# Patient Record
Sex: FEMALE | Race: WHITE | NOT HISPANIC OR LATINO | Employment: OTHER | URBAN - METROPOLITAN AREA
[De-identification: names, ages, dates, MRNs, and addresses within clinical notes are randomized per-mention and may not be internally consistent; named-entity substitution may affect disease eponyms.]

---

## 2017-02-25 ENCOUNTER — APPOINTMENT (EMERGENCY)
Dept: RADIOLOGY | Facility: HOSPITAL | Age: 72
End: 2017-02-25
Payer: MEDICARE

## 2017-02-25 ENCOUNTER — HOSPITAL ENCOUNTER (EMERGENCY)
Facility: HOSPITAL | Age: 72
Discharge: HOME | End: 2017-02-25
Attending: EMERGENCY MEDICINE | Admitting: EMERGENCY MEDICINE
Payer: MEDICARE

## 2017-02-25 VITALS
SYSTOLIC BLOOD PRESSURE: 162 MMHG | RESPIRATION RATE: 18 BRPM | OXYGEN SATURATION: 100 % | BODY MASS INDEX: 25.23 KG/M2 | HEIGHT: 66 IN | DIASTOLIC BLOOD PRESSURE: 71 MMHG | TEMPERATURE: 97.9 F | WEIGHT: 157 LBS | HEART RATE: 81 BPM
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
## 2017-02-25 DIAGNOSIS — S02.2XXA CLOSED FRACTURE OF NASAL BONE, INITIAL ENCOUNTER: Primary | ICD-10-CM

## 2017-02-25 DIAGNOSIS — S80.211A ABRASION OF KNEE, BILATERAL: ICD-10-CM

## 2017-02-25 DIAGNOSIS — S80.212A ABRASION OF KNEE, BILATERAL: ICD-10-CM

## 2017-02-25 DIAGNOSIS — S62.655A CLOSED NONDISPLACED FRACTURE OF MIDDLE PHALANX OF LEFT RING FINGER, INITIAL ENCOUNTER: ICD-10-CM

## 2017-02-25 PROCEDURE — 73564 X-RAY EXAM KNEE 4 OR MORE: CPT

## 2017-02-25 PROCEDURE — 73140 X-RAY EXAM OF FINGER(S): CPT

## 2017-02-25 PROCEDURE — 70450 CT HEAD/BRAIN W/O DYE: CPT

## 2017-02-25 PROCEDURE — 99284 EMERGENCY DEPT VISIT MOD MDM: CPT

## 2017-02-25 PROCEDURE — 70486 CT MAXILLOFACIAL W/O DYE: CPT

## 2017-02-25 RX ORDER — OXYMETAZOLINE HYDROCHLORIDE 0.05 G/100ML
2 SPRAY NASAL 2 TIMES DAILY
Qty: 14.7 ML | Refills: 0 | Status: SHIPPED | OUTPATIENT
Start: 2017-02-25 | End: 2019-03-07 | Stop reason: ALTCHOICE

## 2017-02-25 RX ORDER — ACETAMINOPHEN 325 MG/1
TABLET ORAL
Status: COMPLETED
Start: 2017-02-25 | End: 2017-02-25

## 2017-02-25 RX ORDER — ACETAMINOPHEN 325 MG/1
650 TABLET ORAL ONCE
Status: DISCONTINUED | OUTPATIENT
Start: 2017-02-25 | End: 2017-02-25 | Stop reason: HOSPADM

## 2017-02-25 RX ORDER — BACITRACIN, NEOMYCIN, POLYMYXIN B 400; 3.5; 5 [USP'U]/G; MG/G; [USP'U]/G
1 OINTMENT TOPICAL 2 TIMES DAILY
Status: DISCONTINUED | OUTPATIENT
Start: 2017-02-25 | End: 2017-02-25 | Stop reason: HOSPADM

## 2017-02-25 RX ORDER — FOLIC ACID/MULTIVIT,IRON,MINER .4-18-35
1 TABLET,CHEWABLE ORAL DAILY
COMMUNITY

## 2017-02-25 RX ADMIN — ACETAMINOPHEN 650 MG: 325 TABLET, FILM COATED ORAL at 09:57

## 2017-02-25 RX ADMIN — NEOMYCIN AND POLYMYXIN B SULFATES AND BACITRACIN ZINC 1 SMALL APPLICATION: 400; 3.5; 5 OINTMENT TOPICAL at 09:59

## 2017-03-07 ENCOUNTER — ALLSCRIPTS OFFICE VISIT (OUTPATIENT)
Dept: OTHER | Facility: OTHER | Age: 72
End: 2017-03-07

## 2017-03-24 ENCOUNTER — ALLSCRIPTS OFFICE VISIT (OUTPATIENT)
Dept: OTHER | Facility: OTHER | Age: 72
End: 2017-03-24

## 2017-03-24 DIAGNOSIS — M19.049 PRIMARY OSTEOARTHRITIS OF HAND: ICD-10-CM

## 2017-03-24 DIAGNOSIS — S62.605A: ICD-10-CM

## 2017-04-03 ENCOUNTER — GENERIC CONVERSION - ENCOUNTER (OUTPATIENT)
Dept: OTHER | Facility: OTHER | Age: 72
End: 2017-04-03

## 2017-04-03 ENCOUNTER — APPOINTMENT (OUTPATIENT)
Dept: OCCUPATIONAL THERAPY | Age: 72
End: 2017-04-03
Payer: MEDICARE

## 2017-04-03 DIAGNOSIS — S62.605A: ICD-10-CM

## 2017-04-03 DIAGNOSIS — M19.049 PRIMARY OSTEOARTHRITIS OF HAND: ICD-10-CM

## 2017-04-03 PROCEDURE — G8990 OTHER PT/OT CURRENT STATUS: HCPCS

## 2017-04-03 PROCEDURE — 97165 OT EVAL LOW COMPLEX 30 MIN: CPT

## 2017-04-03 PROCEDURE — G8991 OTHER PT/OT GOAL STATUS: HCPCS

## 2017-04-12 ENCOUNTER — APPOINTMENT (OUTPATIENT)
Dept: OCCUPATIONAL THERAPY | Age: 72
End: 2017-04-12
Payer: MEDICARE

## 2017-04-12 PROCEDURE — 97140 MANUAL THERAPY 1/> REGIONS: CPT

## 2017-04-12 PROCEDURE — 97018 PARAFFIN BATH THERAPY: CPT

## 2017-04-12 PROCEDURE — 97110 THERAPEUTIC EXERCISES: CPT

## 2017-04-14 ENCOUNTER — APPOINTMENT (OUTPATIENT)
Dept: OCCUPATIONAL THERAPY | Age: 72
End: 2017-04-14
Payer: MEDICARE

## 2017-04-17 ENCOUNTER — APPOINTMENT (OUTPATIENT)
Dept: OCCUPATIONAL THERAPY | Age: 72
End: 2017-04-17
Payer: MEDICARE

## 2017-04-17 PROCEDURE — 97110 THERAPEUTIC EXERCISES: CPT

## 2017-04-17 PROCEDURE — 97018 PARAFFIN BATH THERAPY: CPT

## 2017-04-21 ENCOUNTER — APPOINTMENT (OUTPATIENT)
Dept: OCCUPATIONAL THERAPY | Age: 72
End: 2017-04-21
Payer: MEDICARE

## 2017-04-21 PROCEDURE — 97110 THERAPEUTIC EXERCISES: CPT

## 2017-04-21 PROCEDURE — G8991 OTHER PT/OT GOAL STATUS: HCPCS

## 2017-04-21 PROCEDURE — 97140 MANUAL THERAPY 1/> REGIONS: CPT

## 2017-04-21 PROCEDURE — G8992 OTHER PT/OT  D/C STATUS: HCPCS

## 2017-04-21 PROCEDURE — 97018 PARAFFIN BATH THERAPY: CPT

## 2017-07-06 ENCOUNTER — GENERIC CONVERSION - ENCOUNTER (OUTPATIENT)
Dept: OTHER | Facility: OTHER | Age: 72
End: 2017-07-06

## 2017-10-13 ENCOUNTER — ALLSCRIPTS OFFICE VISIT (OUTPATIENT)
Dept: OTHER | Facility: OTHER | Age: 72
End: 2017-10-13

## 2017-10-13 DIAGNOSIS — Z12.31 ENCOUNTER FOR SCREENING MAMMOGRAM FOR MALIGNANT NEOPLASM OF BREAST: ICD-10-CM

## 2017-10-14 NOTE — PROGRESS NOTES
Assessment  1  Visit for screening mammogram (V76 12) (Z12 31)   2  Encounter for routine gynecological examination (V72 31) (Z01 419)    Pelvic exam reveals the uterus to be anterior mobile normal size  No ovarian masses are detected  Plan  Encounter for routine gynecological examination    · Follow-up visit in 1 year Evaluation and Treatment  Follow-up  Status: Hold For -  Scheduling  Requested for: [id number]   Ordered; For: Encounter for routine gynecological examination; Ordered By: Marry Franco Performed:  Due: 34LFH7205   · (1) THIN PREP PAP WITH IMAGING; Status:Canceled - Retrospective Authorization;    Perform:[address]; Due:13Oct2018; Last Updated By:Treva Umaña; 10/13/2017 10:10:28 AM;Ordered;For:Encounter for routine gynecological examination; Ordered By:Florentin Davenport; Maturation index required? : No  :   HPV? : if ASCUS  Encounter for screening mammogram for malignant neoplasm of breast    · * MAMMO SCREENING BILATERAL W CAD; Status:Hold For - Scheduling,Retrospective  Authorization; Requested XRA:[id number];    Perform:[facility]; Due:13Oct2018; Last Updated By:Treva Umaña; 10/13/2017 8:32:49 AM;Ordered;For:Encounter for screening mammogram for malignant neoplasm of breast; Ordered By:Florentin Davenport; A cervical Pap smear was  taken at this visit  The patient was given a slip for bilateral screening mammogram to be performed after July 6, 2018  The patient will return in one year for follow-up GYN evaluation     Discussion/Summary    The patient was told that her breast and pelvic exam are normal  She will use Estrace vaginal cream 1 g in the vagina once a week  She was given samples the last 4 year  She will call if she sees any vaginal bleeding over the next year        Chief Complaint  annual exam no problems      History of Present Illness  HPI: This 79-year-old patient has had no vaginal bleeding or episodes of vaginitis over the past year  [place] is healthy with the use of Estrace vaginal cream 1 g in the vagina once a week  She is sensitive to heat  She has no chronic headaches or fainting spells  She does use a liner daily due to some pain or drainage at times  GYN HM, Adult Female [facility]:   General Health:   Lifestyle:  She does not use tobacco -- She consumes alcohol  Reproductive health: the patient is postmenopausal--   she reports no menstrual problems  -- she uses no contraception  -- she is sexually active  -- pregnancy history: G 2P 2,-- 2    Screening: Cervical cancer screening includes a pap smear performed 10/10/2016  Breast cancer screening includes a mammogram performed 7/6/2016  Colorectal cancer screening includes uncertain timing of the last colonoscopy  Review of Systems    Constitutional: No fever, no chills, feels well, no tiredness, no recent weight gain or loss  ENT: no ear ache, no loss of hearing, no nosebleeds or nasal discharge, no sore throat or hoarseness  Cardiovascular: no complaints of slow or fast heart rate, no chest pain, no palpitations, no leg claudication or lower extremity edema  Respiratory: no complaints of shortness of breath, no wheezing, no dyspnea on exertion, no orthopnea or PND  Breasts: no complaints of breast pain, breast lump or nipple discharge  Gastrointestinal: diarrhea, but-- as noted in HPI  Genitourinary: no complaints of dysuria, no incontinence, no pelvic pain, no dysmenorrhea, no vaginal discharge or abnormal vaginal bleeding  Musculoskeletal: no complaints of arthralgia, no myalgia, no joint swelling or stiffness, no limb pain or swelling  Integumentary: no complaints of skin rash or lesion, no itching or dry skin, no skin wounds  Neurological: no complaints of headache, no confusion, no numbness or tingling, no dizziness or fainting  Active Problems  1  ASCUS favoring benign (796 9)   2   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) 3  Encounter for routine gynecological examination (V72 31) (Z01 419)   4  Encounter for screening mammogram for malignant neoplasm of breast (V76 12)   (Z12 31)   5  Fracture of phalanx of left ring finger (816 00) (S67 950H)   6  History of abnormal cervical Pap smear (V13 29) (Z87 898)   7  Menopause (627 2)   8  Pap smear abnormality of cervix with ASCUS favoring benign (795 01) (R87 610)   9  Pap smear, as part of routine gynecological examination (V76 2) (Z01 419)   10  Visit for screening mammogram (V76 12) (Z12 31)    Past Medical History   · History of Asymptomatic menopausal state (V49 81) (Z78 0)   · History Of 2  Previous Pregnancies (V61 5)   · History of osteopenia (V13 59) (Z87 39)   · History of sprain of ankle (V13 59) (Y70 428)    Surgical History   · History of Cholecystectomy Laparoscopic    Family History  Father    · No pertinent family history  Family History    · Family history of Heart Disease (V17 49)    Social History   · Being A Social Drinker   · Exercising Regularly   · Never A Smoker    Current Meds   1  Aspirin 81 MG TABS; Therapy: (Recorded:52Xpp6230) to Recorded   2  Estrace 1 MG Oral Tablet; Therapy: (Recorded:13Oct2017) to Recorded   3  Multi-Vitamin TABS; Therapy: (Janneth Bates) to Recorded   4  Simvastatin 10 MG Oral Tablet; Therapy: 99GHH9808 to (Last Rx:17Nov2010)  Requested for: 13XIC9525 Ordered    Allergies  1  Benadryl TABS   2  Penicillins    Vitals   Recorded: 13WEJ6335 20:48LP   Systolic 439   Diastolic 72   Height 5 ft 5 5 in   Weight 162 lb    BMI Calculated 26 55   BSA Calculated 1 82   LMP      Physical Exam    Constitutional   General appearance: No acute distress, well appearing and well nourished  Neck   Neck: Normal, supple, trachea midline, no masses  Thyroid: Normal, no thyromegaly  Pulmonary   Respiratory effort: No increased work of breathing or signs of respiratory distress  Auscultation of lungs: Clear to auscultation  Cardiovascular   Auscultation of heart: Normal rate and rhythm, normal S1 and S2, no murmurs  Peripheral vascular exam: Normal pulses Throughout  Genitourinary   External genitalia: Normal and no lesions appreciated  Vagina: Normal, no lesions or dryness appreciated  Urethra: Normal     Urethral meatus: Normal     Bladder: Normal, soft, non-tender and no prolapse or masses appreciated  Cervix: Normal, no palpable masses  Uterus: Normal, non-tender, not enlarged, and no palpable masses  Adnexa/parametria: Normal, non-tender and no fullness or masses appreciated  Anus, perineum, and rectum: Normal sphincter tone, no masses, and no prolapse  Chest   Breasts: Normal and no dimpling or skin changes noted  Abdomen   Abdomen: Normal, non-tender, and no organomegaly noted  Liver and spleen: No hepatomegaly or splenomegaly  Examination for hernias: No hernias appreciated  Lymphatic   Palpation of lymph nodes in neck, axillae, groin and/or other locations: No lymphadenopathy or masses noted  Skin   Skin and subcutaneous tissue: Normal skin turgor and no rashes      Palpation of skin and subcutaneous tissue: Normal     Psychiatric   Orientation to person, place, and time: Normal     Mood and affect: Normal        Signatures   Electronically signed by : Elsa Ken MD; Oct 13 2017 10:31AM EST                       (Author)

## 2017-10-17 ENCOUNTER — APPOINTMENT (OUTPATIENT)
Dept: LAB | Facility: HOSPITAL | Age: 72
End: 2017-10-17
Payer: MEDICARE

## 2017-10-17 ENCOUNTER — TRANSCRIBE ORDERS (OUTPATIENT)
Dept: LAB | Facility: HOSPITAL | Age: 72
End: 2017-10-17

## 2017-10-17 DIAGNOSIS — E55.9 AVITAMINOSIS D: ICD-10-CM

## 2017-10-17 DIAGNOSIS — E78.5 HYPERLIPIDEMIA, UNSPECIFIED HYPERLIPIDEMIA TYPE: ICD-10-CM

## 2017-10-17 DIAGNOSIS — E78.5 HYPERLIPIDEMIA, UNSPECIFIED HYPERLIPIDEMIA TYPE: Primary | ICD-10-CM

## 2017-10-17 LAB
25(OH)D3 SERPL-MCNC: 40.4 NG/ML (ref 30–100)
ALBUMIN SERPL BCP-MCNC: 3.8 G/DL (ref 3.5–5)
ALP SERPL-CCNC: 67 U/L (ref 46–116)
ALT SERPL W P-5'-P-CCNC: 28 U/L (ref 12–78)
ANION GAP SERPL CALCULATED.3IONS-SCNC: 5 MMOL/L (ref 4–13)
AST SERPL W P-5'-P-CCNC: 16 U/L (ref 5–45)
BILIRUB SERPL-MCNC: 0.64 MG/DL (ref 0.2–1)
BUN SERPL-MCNC: 14 MG/DL (ref 5–25)
CALCIUM SERPL-MCNC: 8.8 MG/DL (ref 8.3–10.1)
CHLORIDE SERPL-SCNC: 106 MMOL/L (ref 100–108)
CHOLEST SERPL-MCNC: 178 MG/DL (ref 50–200)
CO2 SERPL-SCNC: 30 MMOL/L (ref 21–32)
CREAT SERPL-MCNC: 0.72 MG/DL (ref 0.6–1.3)
GFR SERPL CREATININE-BSD FRML MDRD: 84 ML/MIN/1.73SQ M
GLUCOSE P FAST SERPL-MCNC: 97 MG/DL (ref 65–99)
HDLC SERPL-MCNC: 85 MG/DL (ref 40–60)
LDLC SERPL CALC-MCNC: 69 MG/DL (ref 0–100)
POTASSIUM SERPL-SCNC: 3.7 MMOL/L (ref 3.5–5.3)
PROT SERPL-MCNC: 7.6 G/DL (ref 6.4–8.2)
SODIUM SERPL-SCNC: 141 MMOL/L (ref 136–145)
TRIGL SERPL-MCNC: 120 MG/DL

## 2017-10-17 PROCEDURE — 36415 COLL VENOUS BLD VENIPUNCTURE: CPT

## 2017-10-17 PROCEDURE — 80053 COMPREHEN METABOLIC PANEL: CPT

## 2017-10-17 PROCEDURE — 82306 VITAMIN D 25 HYDROXY: CPT

## 2017-10-17 PROCEDURE — 80061 LIPID PANEL: CPT

## 2017-11-30 ENCOUNTER — ANESTHESIA EVENT (OUTPATIENT)
Dept: GASTROENTEROLOGY | Facility: HOSPITAL | Age: 72
End: 2017-11-30
Payer: MEDICARE

## 2017-12-01 ENCOUNTER — HOSPITAL ENCOUNTER (OUTPATIENT)
Facility: HOSPITAL | Age: 72
Setting detail: OUTPATIENT SURGERY
Discharge: HOME | End: 2017-12-01
Attending: COLON & RECTAL SURGERY | Admitting: COLON & RECTAL SURGERY
Payer: MEDICARE

## 2017-12-01 ENCOUNTER — ANESTHESIA (OUTPATIENT)
Dept: GASTROENTEROLOGY | Facility: HOSPITAL | Age: 72
End: 2017-12-01
Payer: MEDICARE

## 2017-12-01 VITALS
BODY MASS INDEX: 25.88 KG/M2 | TEMPERATURE: 97.6 F | HEIGHT: 66 IN | SYSTOLIC BLOOD PRESSURE: 108 MMHG | DIASTOLIC BLOOD PRESSURE: 54 MMHG | WEIGHT: 161 LBS | OXYGEN SATURATION: 98 % | HEART RATE: 72 BPM | RESPIRATION RATE: 16 BRPM

## 2017-12-01 RX ORDER — SODIUM CHLORIDE 9 MG/ML
125 INJECTION, SOLUTION INTRAVENOUS CONTINUOUS
Status: DISCONTINUED | OUTPATIENT
Start: 2017-12-01 | End: 2017-12-04 | Stop reason: HOSPADM

## 2017-12-01 RX ORDER — PROPOFOL 10 MG/ML
INJECTION, EMULSION INTRAVENOUS CONTINUOUS PRN
Status: DISCONTINUED | OUTPATIENT
Start: 2017-12-01 | End: 2017-12-01 | Stop reason: SURG

## 2017-12-01 RX ORDER — PROPOFOL 10 MG/ML
INJECTION, EMULSION INTRAVENOUS AS NEEDED
Status: DISCONTINUED | OUTPATIENT
Start: 2017-12-01 | End: 2017-12-01 | Stop reason: SURG

## 2017-12-01 RX ADMIN — PROPOFOL 50 MG: 10 INJECTION, EMULSION INTRAVENOUS at 10:06

## 2017-12-01 RX ADMIN — PROPOFOL 100 MCG/KG/MIN: 10 INJECTION, EMULSION INTRAVENOUS at 10:07

## 2017-12-01 RX ADMIN — SODIUM CHLORIDE 125 ML/HR: 0.9 INJECTION, SOLUTION INTRAVENOUS at 09:38

## 2017-12-01 RX ADMIN — PROPOFOL 50 MG: 10 INJECTION, EMULSION INTRAVENOUS at 10:08

## 2017-12-01 NOTE — H&P
History and Physical   Colon and Rectal Surgery   Sandra Case 67 y o  female MRN: [id number]  Unit/Bed#: ENDO POOL Encounter: [id number]  12/01/17   10:05 AM      No chief complaint on file  History of Present Illness   HPI:  Sandra Case is a 67 y o  female who presents with incontinence, screening colonoscopy  Historical Information   Past Medical History:   Diagnosis Date    Hyperlipidemia     Positional vertigo      Past Surgical History:   Procedure Laterality Date    BREAST LUMPECTOMY      KNEE SURGERY         Meds/Allergies     Prescriptions Prior to Admission   Medication    Ascorbic Acid (VITAMIN C PO)    aspirin 81 MG tablet    cholecalciferol (VITAMIN D3) 1,000 units tablet    multivitamin-iron-minerals-folic acid (CENTRUM) chewable tablet    oxymetazoline (AFRIN) 0 05 % nasal spray    simvastatin (ZOCOR) 10 mg tablet    sodium chloride (OCEAN) 0 65 % nasal spray         Current Facility-Administered Medications:     sodium chloride 0 9 % infusion, 125 mL/hr, Intravenous, Continuous, Alecia Roth MD, Last Rate: 125 mL/hr at 12/01/17 0938, 125 mL/hr at 12/01/17 3178    Allergies   Allergen Reactions    Benadryl [Diphenhydramine]     Penicillins          Social History   History   Alcohol Use    Yes     Comment: socially     History   Drug Use No     History   Smoking Status    Never Smoker   Smokeless Tobacco    Never Used         Family History: History reviewed  No pertinent family history        Objective     Current Vitals:   Blood Pressure: 120/74 (12/01/17 0927)  Pulse: 89 (12/01/17 0927)  Temperature: 97 6 °F (36 4 °C) (12/01/17 0927)  Temp Source: Oral (12/01/17 0927)  Respirations: 16 (12/01/17 0927)  Height: 5' 6" (167 6 cm) (12/01/17 3923)  Weight - Scale: 73 kg (161 lb) (12/01/17 0927)  SpO2: 99 % (12/01/17 0927)  No intake or output data in the 24 hours ending 12/01/17 1005    Physical Exam:  General: No acute distress  Eyes: Normal   ENT: Normal   Neck: No JVD  Pulm: Normal in A&P  CV: NSR no murmur  Abdomen: Soft and normal on palpation, no mass, no tenderness, no guarding  Rectal: Normal sphincter tone, no perianal skin lesions  Extremities: Normal  Lymphatics: Normal        Lab Results: I have personally reviewed pertinent lab results  Imaging: I have personally reviewed pertinent reports  Patient was consented by myself for procedure as explained earlier with all the risks and benefits described  All questions answered  ASSESSMENT:  Magali Solis is a 67 y o  female who presents with incontinence, screening colonoscopy  Alek Jennings       PLAN:  colonoscopy

## 2017-12-01 NOTE — ANESTHESIA PREPROCEDURE EVALUATION
Review of Systems/Medical History  Patient summary reviewed    No history of anesthetic complications     Cardiovascular  EKG reviewed, Hyperlipidemia,    Pulmonary  Negative pulmonary ROS ,        GI/Hepatic    GERD , Bowel prep       Negative  ROS        Endo/Other  Negative endo/other ROS      GYN       Hematology  Negative hematology ROS      Musculoskeletal  Negative musculoskeletal ROS        Neurology  Negative neurology ROS      Psychology   Negative psychology ROS            Physical Exam    Airway    Mallampati score: I  TM Distance: <3 FB  Neck ROM: full     Dental   upper dentures and lower dentures,     Cardiovascular      Pulmonary      Other Findings  Upper and lower partials      Anesthesia Plan  ASA Score- 2       Anesthesia Type- IV sedation with anesthesia with ASA Monitors  Additional Monitors:   Airway Plan:           Induction- intravenous  Informed Consent- Anesthetic plan and risks discussed with patient  I personally reviewed this patient with the CRNA  Discussed and agreed on the Anesthesia Plan with the CRNA  [address] Way

## 2017-12-01 NOTE — ANESTHESIA POSTPROCEDURE EVALUATION
Post-Op Assessment Note      CV Status:  Stable    Mental Status:  Awake    Hydration Status:  Euvolemic    PONV Controlled:  Controlled    Airway Patency:  Patent    Post Op Vitals Reviewed: Yes          Staff: CRNA           BP   104/58   Temp      Pulse  84   Resp   22   SpO2   98 RA

## 2017-12-01 NOTE — OP NOTE
**** GI/ENDOSCOPY REPORT ****     PATIENT NAME: BENITO SULLIVAN ------ VISIT ID:     INTRODUCTION: Colonoscopy - A 67 female patient presents for an outpatient   Colonoscopy at [facility]  PREVIOUS COLONOSCOPY:     INDICATIONS: Incontinence  Screening-ADR  CONSENT:  The benefits, risks, and alternatives to the procedure were   discussed and informed consent was obtained from the patient  PREPARATION: The patient was identified by myself both verbally and by   visual inspection of ID band  EKG, pulse, pulse oximetry and blood   pressure were monitored throughout the procedure  ASA Classification:   Class 1 - Patient has no organic, physiologic, biochemical, or psychiatric   disturbance  Airway Assessment Classification: Airway class 1 -   Visualization of the soft palate, fauces, uvula, and posterior pillars  MEDICATIONS: Anesthesia-check records     RECTAL EXAM: Mildly decreased sphincter tone  No internal hemorrhoids  No   external hemorrhoids  PROCEDURE:  The endoscope was passed without difficulty through the anus   under direct visualization and advanced to the cecum, confirmed by   photographs  The quality of the preparation was  The scope was withdrawn   and the mucosa was carefully examined  The views were good  The patient's   toleration of the procedure was good  Cecal Intubation Time: 4 minutes(s)   Cecal Withdrawal Time: 7 minutes(s)     FINDINGS:  There was no evidence of erythematous mucosae, polyps, or   tumors in the colon  There was evidence of mild diverticulosis in the   sigmoid colon  Otherwise, the colon appeared to be normal  Appendiceal   opening identified     COMPLICATIONS: There were no complications  IMPRESSIONS: Mildly decreased sphincter tone was noted during the rectal   exam  No evidence of erythematous mucosae, polyps, and tumors in the   colon  Mild diverticulosis found in the sigmoid colon       RECOMMENDATIONS: Continue current medications  Call if you are having any   problems: Dr Renée Franklin [phone]  Start high fiber diet  Colonoscopy   recommended in 5 years  Office in 2-4 weeks to evaluate and discuss   evaluation and treatment for fecal incontinence  Call if any signs or   symptoms of abdominal pain, bleeding or diverticulitis  PATHOLOGY SPECIMENS:     PROCEDURE CODES: Colonoscopy     ICD-9 Codes: 562 10 Diverticulosis of colon (without mention of hemorrhage)     ICD-10 Codes: K59 8 Other specified functional intestinal disorders K57   Diverticular disease of intestine     PERFORMED BY: PILI Dial  on 12/01/2017  Version 1, electronically signed by PILI Dial  on   12/01/2017 at 10:23

## 2018-01-12 VITALS
SYSTOLIC BLOOD PRESSURE: 118 MMHG | BODY MASS INDEX: 26.03 KG/M2 | DIASTOLIC BLOOD PRESSURE: 72 MMHG | WEIGHT: 162 LBS | HEIGHT: 66 IN

## 2018-01-13 VITALS
BODY MASS INDEX: 25.07 KG/M2 | HEIGHT: 66 IN | WEIGHT: 156 LBS | SYSTOLIC BLOOD PRESSURE: 120 MMHG | HEART RATE: 87 BPM | DIASTOLIC BLOOD PRESSURE: 76 MMHG

## 2018-01-13 VITALS
WEIGHT: 155 LBS | DIASTOLIC BLOOD PRESSURE: 84 MMHG | SYSTOLIC BLOOD PRESSURE: 124 MMHG | HEART RATE: 89 BPM | BODY MASS INDEX: 24.91 KG/M2 | HEIGHT: 66 IN

## 2018-01-13 NOTE — RESULT NOTES
Verified Results  (1) THIN PREP PAP FOLLOW UP WITH IMAGING [id number] 10:02AM Tom Weathers     Test Name Result Flag Reference   LAB AP CASE REPORT (Report)     Gynecologic Cytology Report            Case: [id number]                  Authorizing Provider: Deidra Simpson MD     Collected:      10/10/2016 1002        First Screen:     GÓMEZ Francois    Received:      10/12/2016 1002        Specimen:  LIQUID-BASED PAP, DIAGNOSTIC, Cervix   LAB AP GYN PRIMARY INTERPRETATION      Negative for intraepithelial lesion or malignancy  Electronically signed by GÓMEZ Francois on 10/17/2016 at 8:19 AM   LAB AP GYN SPECIMEN ADEQUACY      Satisfactory for evaluation  Endocervical/transformation zone component present  LAB AP GYN ADDITIONAL INFORMATION (Report)     [facility]'s FDA approved ,  and ThinPrep Imaging System are   utilized with strict adherence to the 's instruction manual to   prepare gynecologic and non-gynecologic cytology specimens for the   production of ThinPrep slides as well as for gynecologic ThinPrep imaging  These processes have been validated by our laboratory and/or by the     The Pap test is not a diagnostic procedure and should not be used as the   sole means to detect cervical cancer  It is only a screening procedure to   aid in the detection of cervical cancer and its precursors  Both   false-negative and false-positive results have been experienced  Your   patient's test result should be interpreted in this context together with   the history and clinical findings     LAB AP LMP

## 2018-10-19 ENCOUNTER — ANNUAL EXAM (OUTPATIENT)
Dept: OBGYN CLINIC | Facility: CLINIC | Age: 73
End: 2018-10-19
Payer: MEDICARE

## 2018-10-19 VITALS
BODY MASS INDEX: 26.59 KG/M2 | DIASTOLIC BLOOD PRESSURE: 72 MMHG | HEIGHT: 65 IN | WEIGHT: 159.6 LBS | SYSTOLIC BLOOD PRESSURE: 112 MMHG

## 2018-10-19 DIAGNOSIS — Z12.31 ENCOUNTER FOR SCREENING MAMMOGRAM FOR MALIGNANT NEOPLASM OF BREAST: ICD-10-CM

## 2018-10-19 DIAGNOSIS — Z01.419 ENCOUNTER FOR GYNECOLOGICAL EXAMINATION (GENERAL) (ROUTINE) WITHOUT ABNORMAL FINDINGS: Primary | ICD-10-CM

## 2018-10-19 DIAGNOSIS — Z12.4 SCREENING FOR CERVICAL CANCER: ICD-10-CM

## 2018-10-19 PROCEDURE — G0101 CA SCREEN;PELVIC/BREAST EXAM: HCPCS | Performed by: OBSTETRICS & GYNECOLOGY

## 2018-10-19 PROCEDURE — G0145 SCR C/V CYTO,THINLAYER,RESCR: HCPCS | Performed by: OBSTETRICS & GYNECOLOGY

## 2018-10-19 RX ORDER — ESTRADIOL 1 MG/1
TABLET ORAL
COMMUNITY
End: 2019-03-07

## 2018-10-19 NOTE — PATIENT INSTRUCTIONS
This 29-year-old patient was told that her and pelvic exam are normal  She will see her P CP for in about 2 weeks and will tell him of my finding of a an irregular heart rhythm

## 2018-10-19 NOTE — PROGRESS NOTES
Assessment/Plan: This 77-year-old patient is seen today for annual gyn evaluation  She does wear liners for urine stress incontinence and does use Estrace vaginal cream 1 g in the vagina a per week  No problem-specific Assessment & Plan notes found for this encounter  Subjective:      Patient ID: Mercedez Chapman is a 68 y o  female  This 77-year-old patient has had no vaginal bleeding or episodes of vaginitis over the past year  She does use Estrace vaginal cream 1 g in the vagina once a week  She will use a lubricant to try to make intercourse more comfortable  She has no chronic headaches or fainting spells  She is sensitive to the eat  She sleeps about 7 hours at night  She does have leakage from her intestine and her colorectal surgeon once the place a device in her back tell controlled this  She also has occasional episodes urine stress incontinence  She has had no urinary tract infections over the past year  She did have a normal mammogram July 6, 2018  She did lose 30 lb to 159 from a year ago  Her blood pressure is 112/72  Gynecologic Exam             Review of Systems   Constitutional: Negative  HENT: Negative  Eyes: Negative  Respiratory: Negative  Cardiovascular: Negative  Gastrointestinal: Negative  She is seen by a colorectal surgeon for bowel seepage through  her anal canal    Endocrine: Negative  Genitourinary: Negative  Musculoskeletal: Negative  Skin: Negative  Allergic/Immunologic: Negative  Neurological: Negative  Hematological: Negative  Psychiatric/Behavioral: Negative  Objective:      /72   Ht 5' 4 5" (1 638 m)   Wt 72 4 kg (159 lb 9 6 oz)   BMI 26 97 kg/m²          Physical Exam   Constitutional: She is oriented to person, place, and time  She appears well-developed and well-nourished  HENT:   Head: Normocephalic  Neck: Normal range of motion  Neck supple     Cardiovascular: Normal rate, normal heart sounds and intact distal pulses  She does have an irregular heart rhythm detected on auscultation  Pulmonary/Chest: Effort normal and breath sounds normal    Abdominal: Soft  Bowel sounds are normal    Genitourinary: Uterus normal    Musculoskeletal: Normal range of motion  Neurological: She is alert and oriented to person, place, and time  Skin: Skin is warm and dry  Psychiatric: She has a normal mood and affect  Nursing note and vitals reviewed  her uterus is anterior mobile normal size  No adnexal masses are identified  The cervix is normal to appearance  There is no blood or discharge in the vagina  The vulva is normal  Rectal exam shows no  masses in the rectum and no nodularity in the cul-de-sac

## 2018-10-20 ENCOUNTER — APPOINTMENT (OUTPATIENT)
Dept: LAB | Facility: HOSPITAL | Age: 73
End: 2018-10-20
Payer: MEDICARE

## 2018-10-20 ENCOUNTER — TRANSCRIBE ORDERS (OUTPATIENT)
Dept: LAB | Facility: HOSPITAL | Age: 73
End: 2018-10-20

## 2018-10-20 DIAGNOSIS — E78.5 HYPERLIPIDEMIA, UNSPECIFIED HYPERLIPIDEMIA TYPE: ICD-10-CM

## 2018-10-20 DIAGNOSIS — E55.9 AVITAMINOSIS D: ICD-10-CM

## 2018-10-20 DIAGNOSIS — E78.5 HYPERLIPIDEMIA, UNSPECIFIED HYPERLIPIDEMIA TYPE: Primary | ICD-10-CM

## 2018-10-20 LAB
25(OH)D3 SERPL-MCNC: 42.2 NG/ML (ref 30–100)
ALBUMIN SERPL BCP-MCNC: 3.7 G/DL (ref 3.5–5)
ALP SERPL-CCNC: 62 U/L (ref 46–116)
ALT SERPL W P-5'-P-CCNC: 33 U/L (ref 12–78)
ANION GAP SERPL CALCULATED.3IONS-SCNC: 6 MMOL/L (ref 4–13)
AST SERPL W P-5'-P-CCNC: 18 U/L (ref 5–45)
BILIRUB SERPL-MCNC: 0.72 MG/DL (ref 0.2–1)
BUN SERPL-MCNC: 15 MG/DL (ref 5–25)
CALCIUM SERPL-MCNC: 8.8 MG/DL (ref 8.3–10.1)
CHLORIDE SERPL-SCNC: 106 MMOL/L (ref 100–108)
CHOLEST SERPL-MCNC: 178 MG/DL (ref 50–200)
CO2 SERPL-SCNC: 27 MMOL/L (ref 21–32)
CREAT SERPL-MCNC: 0.79 MG/DL (ref 0.6–1.3)
GFR SERPL CREATININE-BSD FRML MDRD: 74 ML/MIN/1.73SQ M
GLUCOSE P FAST SERPL-MCNC: 97 MG/DL (ref 65–99)
HDLC SERPL-MCNC: 72 MG/DL (ref 40–60)
LDLC SERPL CALC-MCNC: 83 MG/DL (ref 0–100)
NONHDLC SERPL-MCNC: 106 MG/DL
POTASSIUM SERPL-SCNC: 4.1 MMOL/L (ref 3.5–5.3)
PROT SERPL-MCNC: 7.5 G/DL (ref 6.4–8.2)
SODIUM SERPL-SCNC: 139 MMOL/L (ref 136–145)
TRIGL SERPL-MCNC: 113 MG/DL

## 2018-10-20 PROCEDURE — 82306 VITAMIN D 25 HYDROXY: CPT

## 2018-10-20 PROCEDURE — 80061 LIPID PANEL: CPT

## 2018-10-20 PROCEDURE — 80053 COMPREHEN METABOLIC PANEL: CPT

## 2018-10-20 PROCEDURE — 36415 COLL VENOUS BLD VENIPUNCTURE: CPT

## 2018-10-23 ENCOUNTER — LAB REQUISITION (OUTPATIENT)
Dept: LAB | Facility: HOSPITAL | Age: 73
End: 2018-10-23
Payer: MEDICARE

## 2018-10-23 DIAGNOSIS — E05.90 THYROTOXICOSIS WITHOUT THYROID STORM: ICD-10-CM

## 2018-10-23 DIAGNOSIS — R00.2 PALPITATIONS: ICD-10-CM

## 2018-10-23 LAB
T4 FREE SERPL-MCNC: 1.11 NG/DL (ref 0.76–1.46)
TSH SERPL DL<=0.05 MIU/L-ACNC: 1.81 UIU/ML (ref 0.36–3.74)

## 2018-10-23 PROCEDURE — 84439 ASSAY OF FREE THYROXINE: CPT | Performed by: INTERNAL MEDICINE

## 2018-10-23 PROCEDURE — 84443 ASSAY THYROID STIM HORMONE: CPT | Performed by: INTERNAL MEDICINE

## 2018-10-24 LAB
LAB AP GYN PRIMARY INTERPRETATION: NORMAL
Lab: NORMAL

## 2019-03-06 ENCOUNTER — LAB REQUISITION (OUTPATIENT)
Dept: LAB | Facility: HOSPITAL | Age: 74
End: 2019-03-06
Payer: MEDICARE

## 2019-03-06 DIAGNOSIS — Z01.810 ENCOUNTER FOR PREPROCEDURAL CARDIOVASCULAR EXAMINATION: ICD-10-CM

## 2019-03-06 DIAGNOSIS — M20.41 OTHER HAMMER TOE(S) (ACQUIRED), RIGHT FOOT: ICD-10-CM

## 2019-03-06 LAB
ANION GAP SERPL CALCULATED.3IONS-SCNC: 4 MMOL/L (ref 4–13)
BASOPHILS # BLD AUTO: 0.02 THOUSANDS/ΜL (ref 0–0.1)
BASOPHILS NFR BLD AUTO: 0 % (ref 0–1)
BUN SERPL-MCNC: 14 MG/DL (ref 5–25)
CALCIUM SERPL-MCNC: 8.9 MG/DL (ref 8.3–10.1)
CHLORIDE SERPL-SCNC: 105 MMOL/L (ref 100–108)
CO2 SERPL-SCNC: 30 MMOL/L (ref 21–32)
CREAT SERPL-MCNC: 0.77 MG/DL (ref 0.6–1.3)
EOSINOPHIL # BLD AUTO: 0.1 THOUSAND/ΜL (ref 0–0.61)
EOSINOPHIL NFR BLD AUTO: 2 % (ref 0–6)
ERYTHROCYTE [DISTWIDTH] IN BLOOD BY AUTOMATED COUNT: 13.3 % (ref 11.6–15.1)
GFR SERPL CREATININE-BSD FRML MDRD: 77 ML/MIN/1.73SQ M
GLUCOSE SERPL-MCNC: 76 MG/DL (ref 65–140)
HCT VFR BLD AUTO: 42 % (ref 34.8–46.1)
HGB BLD-MCNC: 13.6 G/DL (ref 11.5–15.4)
IMM GRANULOCYTES # BLD AUTO: 0.01 THOUSAND/UL (ref 0–0.2)
IMM GRANULOCYTES NFR BLD AUTO: 0 % (ref 0–2)
LYMPHOCYTES # BLD AUTO: 1.04 THOUSANDS/ΜL (ref 0.6–4.47)
LYMPHOCYTES NFR BLD AUTO: 18 % (ref 14–44)
MCH RBC QN AUTO: 29.3 PG (ref 26.8–34.3)
MCHC RBC AUTO-ENTMCNC: 32.4 G/DL (ref 31.4–37.4)
MCV RBC AUTO: 91 FL (ref 82–98)
MONOCYTES # BLD AUTO: 0.6 THOUSAND/ΜL (ref 0.17–1.22)
MONOCYTES NFR BLD AUTO: 11 % (ref 4–12)
NEUTROPHILS # BLD AUTO: 3.93 THOUSANDS/ΜL (ref 1.85–7.62)
NEUTS SEG NFR BLD AUTO: 69 % (ref 43–75)
NRBC BLD AUTO-RTO: 0 /100 WBCS
PLATELET # BLD AUTO: 241 THOUSANDS/UL (ref 149–390)
PMV BLD AUTO: 11 FL (ref 8.9–12.7)
POTASSIUM SERPL-SCNC: 3.7 MMOL/L (ref 3.5–5.3)
RBC # BLD AUTO: 4.64 MILLION/UL (ref 3.81–5.12)
SODIUM SERPL-SCNC: 139 MMOL/L (ref 136–145)
WBC # BLD AUTO: 5.7 THOUSAND/UL (ref 4.31–10.16)

## 2019-03-06 PROCEDURE — 85025 COMPLETE CBC W/AUTO DIFF WBC: CPT | Performed by: INTERNAL MEDICINE

## 2019-03-06 PROCEDURE — 80048 BASIC METABOLIC PNL TOTAL CA: CPT | Performed by: INTERNAL MEDICINE

## 2019-03-07 NOTE — PRE-PROCEDURE INSTRUCTIONS
Pre-Surgery Instructions:   Medication Instructions    Ascorbic Acid (VITAMIN C PO) Instructed patient per Anesthesia Guidelines   Calcium Citrate (CITRACAL PO) Instructed patient per Anesthesia Guidelines   cholecalciferol (VITAMIN D3) 1,000 units tablet Instructed patient per Anesthesia Guidelines   multivitamin-iron-minerals-folic acid (CENTRUM) chewable tablet Instructed patient per Anesthesia Guidelines   simvastatin (ZOCOR) 10 mg tablet Instructed patient per Anesthesia Guidelines  Patient given/ instructed on use of chlorhexidine soap per hospital protocol    Patient instructed to stop all ASA, NSAIDS, vitamins and herbal supplements one week prior to surgery or per Dr Juan Manuel Vidales

## 2019-03-21 ENCOUNTER — ANESTHESIA EVENT (OUTPATIENT)
Dept: PERIOP | Facility: HOSPITAL | Age: 74
End: 2019-03-21
Payer: MEDICARE

## 2019-03-22 ENCOUNTER — APPOINTMENT (OUTPATIENT)
Dept: RADIOLOGY | Facility: HOSPITAL | Age: 74
End: 2019-03-22
Payer: MEDICARE

## 2019-03-22 ENCOUNTER — HOSPITAL ENCOUNTER (OUTPATIENT)
Facility: HOSPITAL | Age: 74
Setting detail: OUTPATIENT SURGERY
Discharge: HOME | End: 2019-03-22
Attending: PODIATRIST | Admitting: PODIATRIST
Payer: MEDICARE

## 2019-03-22 ENCOUNTER — ANESTHESIA (OUTPATIENT)
Dept: PERIOP | Facility: HOSPITAL | Age: 74
End: 2019-03-22
Payer: MEDICARE

## 2019-03-22 VITALS
RESPIRATION RATE: 20 BRPM | BODY MASS INDEX: 25.39 KG/M2 | HEIGHT: 66 IN | OXYGEN SATURATION: 99 % | HEART RATE: 72 BPM | WEIGHT: 158 LBS | SYSTOLIC BLOOD PRESSURE: 137 MMHG | DIASTOLIC BLOOD PRESSURE: 61 MMHG | TEMPERATURE: 97.7 F

## 2019-03-22 DIAGNOSIS — Z98.890 S/P FOOT SURGERY, RIGHT: Primary | ICD-10-CM

## 2019-03-22 PROCEDURE — C1713 ANCHOR/SCREW BN/BN,TIS/BN: HCPCS | Performed by: PODIATRIST

## 2019-03-22 PROCEDURE — 73630 X-RAY EXAM OF FOOT: CPT

## 2019-03-22 DEVICE — SCREW 20MM RETROFUSION: Type: IMPLANTABLE DEVICE | Site: FOOT | Status: FUNCTIONAL

## 2019-03-22 DEVICE — FOREFOOT IB IMPLANT SYS PEEK: Type: IMPLANTABLE DEVICE | Site: FOOT | Status: FUNCTIONAL

## 2019-03-22 RX ORDER — BUPIVACAINE HYDROCHLORIDE 5 MG/ML
INJECTION, SOLUTION PERINEURAL AS NEEDED
Status: DISCONTINUED | OUTPATIENT
Start: 2019-03-22 | End: 2019-03-22 | Stop reason: HOSPADM

## 2019-03-22 RX ORDER — MAGNESIUM HYDROXIDE 1200 MG/15ML
LIQUID ORAL AS NEEDED
Status: DISCONTINUED | OUTPATIENT
Start: 2019-03-22 | End: 2019-03-22 | Stop reason: HOSPADM

## 2019-03-22 RX ORDER — SODIUM CHLORIDE 9 MG/ML
125 INJECTION, SOLUTION INTRAVENOUS CONTINUOUS
Status: DISCONTINUED | OUTPATIENT
Start: 2019-03-22 | End: 2019-03-22 | Stop reason: HOSPADM

## 2019-03-22 RX ORDER — ONDANSETRON 2 MG/ML
4 INJECTION INTRAMUSCULAR; INTRAVENOUS ONCE AS NEEDED
Status: DISCONTINUED | OUTPATIENT
Start: 2019-03-22 | End: 2019-03-22 | Stop reason: HOSPADM

## 2019-03-22 RX ORDER — PROPOFOL 10 MG/ML
INJECTION, EMULSION INTRAVENOUS CONTINUOUS PRN
Status: DISCONTINUED | OUTPATIENT
Start: 2019-03-22 | End: 2019-03-22 | Stop reason: SURG

## 2019-03-22 RX ORDER — VANCOMYCIN HYDROCHLORIDE 1 G/200ML
15 INJECTION, SOLUTION INTRAVENOUS ONCE
Status: COMPLETED | OUTPATIENT
Start: 2019-03-22 | End: 2019-03-22

## 2019-03-22 RX ORDER — FENTANYL CITRATE 50 UG/ML
INJECTION, SOLUTION INTRAMUSCULAR; INTRAVENOUS AS NEEDED
Status: DISCONTINUED | OUTPATIENT
Start: 2019-03-22 | End: 2019-03-22 | Stop reason: SURG

## 2019-03-22 RX ORDER — VANCOMYCIN HYDROCHLORIDE 1 G/200ML
INJECTION, SOLUTION INTRAVENOUS AS NEEDED
Status: DISCONTINUED | OUTPATIENT
Start: 2019-03-22 | End: 2019-03-22

## 2019-03-22 RX ORDER — MIDAZOLAM HYDROCHLORIDE 1 MG/ML
INJECTION INTRAMUSCULAR; INTRAVENOUS AS NEEDED
Status: DISCONTINUED | OUTPATIENT
Start: 2019-03-22 | End: 2019-03-22 | Stop reason: SURG

## 2019-03-22 RX ORDER — FENTANYL CITRATE/PF 50 MCG/ML
25 SYRINGE (ML) INJECTION
Status: DISCONTINUED | OUTPATIENT
Start: 2019-03-22 | End: 2019-03-22 | Stop reason: HOSPADM

## 2019-03-22 RX ORDER — OXYCODONE HYDROCHLORIDE AND ACETAMINOPHEN 5; 325 MG/1; MG/1
1 TABLET ORAL EVERY 4 HOURS PRN
Status: DISCONTINUED | OUTPATIENT
Start: 2019-03-22 | End: 2019-03-22 | Stop reason: HOSPADM

## 2019-03-22 RX ORDER — ONDANSETRON 2 MG/ML
INJECTION INTRAMUSCULAR; INTRAVENOUS AS NEEDED
Status: DISCONTINUED | OUTPATIENT
Start: 2019-03-22 | End: 2019-03-22 | Stop reason: SURG

## 2019-03-22 RX ADMIN — FENTANYL CITRATE 100 MCG: 50 INJECTION, SOLUTION INTRAMUSCULAR; INTRAVENOUS at 09:12

## 2019-03-22 RX ADMIN — PROPOFOL 100 MCG/KG/MIN: 10 INJECTION, EMULSION INTRAVENOUS at 09:18

## 2019-03-22 RX ADMIN — VANCOMYCIN HYDROCHLORIDE 1000 MG: 1 INJECTION, SOLUTION INTRAVENOUS at 09:13

## 2019-03-22 RX ADMIN — MIDAZOLAM 2 MG: 1 INJECTION INTRAMUSCULAR; INTRAVENOUS at 09:07

## 2019-03-22 RX ADMIN — ONDANSETRON 4 MG: 2 INJECTION INTRAMUSCULAR; INTRAVENOUS at 09:20

## 2019-03-22 RX ADMIN — OXYCODONE HYDROCHLORIDE AND ACETAMINOPHEN 1 TABLET: 5; 325 TABLET ORAL at 12:19

## 2019-03-22 RX ADMIN — SODIUM CHLORIDE 125 ML/HR: 0.9 INJECTION, SOLUTION INTRAVENOUS at 08:12

## 2019-03-22 NOTE — DISCHARGE INSTRUCTIONS
[facility]  Dr Pedro Scruggs  Post-Operative Instructions    1  Take your prescribed medication as directed  2  Upon arrival at home, lie down and elevate your surgical foot on 2 pillows  3  Remain quiet, off your feet as much as possible, for the first 24-48 hours  This is when your feet first swell and may become painful  After 48 hours you may begin limited walking following these restrictions:   Weightbear as tolerated to surgical foot  4  Drink large quantities of water and citrus fruit juice  Consume no alcohol  Continue a well-balanced diet  5  Report any unusual discomfort or fever to this office  6  A limited amount of discomfort and swelling is to be expected  In some cases the skin may take on a bruised appearance  The surgical solution that was applied to your foot prior to the operation is dark in color and the operation site may appear to be oozing when it actually is not  7  A slight amount of blood is to be expected, and is no cause for alarm  Do not remove the dressings  If there is active bleeding and if the bleeding persists, add additional gauze to the bandage, apply direct pressure, elevate your feet and call this office  8  Do not get the dressings wet  As regular bathing may be inconvenient, sponge baths are recommended  9  When anesthesia wears off and if any discomfort should be present, apply an ice pack directly over the operated area for 15 minute intervals for several hours or until the pain leaves  (USE IN EXCESS OF 15 MINUTES COULD CAUSE FROSTBITE)  Do not use hot water bags or electric pads  A convenient icepack can be made by placing ice cubes in a plastic bag and covering this with a towel  10  If necessary, take a mild laxative before retiring  11  Wear your special open shoes anytime you put weight on your foot, even if it is just to walk to the bathroom and back  It will probably be 2 or 3 weeks before you will be permitted to try regular shoes    12  Having performed the operation, we are interested in a prompt recovery  Please cooperate by following the above instructions  13  Please call to confirm your post-op appointment or call with any other questions

## 2019-03-22 NOTE — DISCHARGE SUMMARY
Discharge Summary Outpatient Procedure Podiatry -   Christina Dsouza 68 y o  female MRN: [id number]  Unit/Bed#: OR POOL Encounter: [id number]    Admission Date: 3/22/2019     Admitting Diagnosis: Other hammer toe(s) (acquired), right foot [M20 41]    Discharge Diagnosis: same    Procedures Performed: 2ND MPJ SUBLAXATION REPAIR: 81740 (CPT®)  HAMMERTOE CORRECTION: 33041 (CPT®) right foot    Complications: none    Condition at Discharge: stable    Discharge instructions/Information to patient and family:   See after visit summary for information provided to patient and family  Provisions for Follow-Up Care/Important appointments:  See after visit summary for information related to follow-up care and any pertinent home health orders  Discharge Medications:  See after visit summary for reconciled discharge medications provided to patient and family

## 2019-03-22 NOTE — OP NOTE
OPERATIVE REPORT  PATIENT NAME: Susan Cruz    :    MRN: [id number]  Pt Location: [facility] 03    SURGERY DATE: 3/22/2019    Surgeon(s) and Role:     * Danuta Rosa DPM - Primary     * Adela Longoria DPM - Assisting    Preop Diagnosis:  Other hammer toe(s) (acquired), right foot [M20 41]    Post-Op Diagnosis Codes:     * Other hammer toe(s) (acquired), right foot [M20 41]    Procedure(s) (LRB):  2ND MPJ SUBLAXATION REPAIR (Right)  HAMMERTOE CORRECTION (Right)    Specimen(s):  * No specimens in log *    Estimated Blood Loss:   Minimal    Drains:  * No LDAs found *    Anesthesia Type:   Choice, 10cc of 1:1 mix of 1% lidocaine and 0 5% marcaine plain     Hemostasis:   PAT at 250mmHg for 50 minutes     Materials:   Arthrex retrofusion screw   Arthrex internal brace  3-0 vicryl, 4-0 nylon    Operative Indications: Other hammer toe(s) (acquired), right foot [M20 41]    Operative Findings:  Arthritic changes to the 2nd digit proximal interphalangeal joint and 2nd metatarsal phalangeal joint  Complications:   None    Procedure and Technique:  Under mild sedation, the patient was brought into the operating room and placed on the operating room table in the supine position  A pneumatic ankle tourniquet was then placed around the patient's right ankle with ample webril padding  A time out was performed to confirm the correct patient, procedure and site with all parties in agreement  Following IV sedation, local anesthetic was obtained about the patient's right foot and injection was performed consisting of 10 ml of 1% Lidocaine and 0 5% Bupivacaine in a 1:1 mixture  The foot was then scrubbed, prepped and draped in the usual aseptic manner  An esmarch bandage was utilized to exsangunate the patients foot and the pneumatic ankle tourniquet was then inflated  The esmarch bandage was removed and the foot was placed on the operating room table  Attention was then directed to the right 2nd toe   A hammertoe deformity was present  A  dorsal linear incision was made from the metatarsal- phalangeal joint to the proximal interphalangeal joint  The incision was then deepened via sharp dissection through the subcutaneous tissues, ligating all venous vessels as necessary  Dissection was carried to the level of the EDL tendon  The tendon was then transected at that level  The PIPJ was then exposed and the medial and lateral collateral ligaments were incised to expose the head of the proximal phalanx  By use of sagittal saw, the head of the proximal phalanx was resected  Push test showed that there was still a dorsal flexion contracture was still present at the metatarsal-phalangeal joint  A capsulotomy was then performed at the metatarsalphalangeal joint and a mcglamery elevator was used to release adhesions off the plantar aspect of the metatarsal phalangeal joint  A push test now showed a more normal rectus position of the digit  A sagittal saw was used to remove the articular cartilage off of the base of the middle phalange and the wound was then flushed with copious amounts of normal saline solution  Attention was then directed to the 2nd metatarsal phalangeal joint where the collaterals were released from the base of the proximal phalanx  Then internal brace was applied by Arthrex 's guidelines to hold the 2nd digit in better anatomic alignment  At this time, an arthrex retrofusion screw was used to maintain corrected position of the hammertoe, inserting the the implant in the proximal and middle phalanx  The wound was then flushed with copious amounts of normal sterile solution  The extensor tendon was then reapproximated and maintained a lengthened position with 3-0 Vicryl   Subcutaneous tissue was then reapproximated with 3-0 Vicryl  Skin edges were reapproximated with 4-0 Nylon in a horizontal mattress suture technique       The incision site was then dressed with Betadine soaked adaptic, Dry sterile dressing  The pneumatic ankle tourniquet was deflated and normal hyperemic flush was noted to all digits  The patient tolerated the procedure and anesthesia well and was transported to the PACU with vital signs stable       Patient Disposition:  PACU  and hemodynamically stable    SIGNATURE: Yo Pate DPM  DATE: March 22, 2019  TIME: 10:32 AM

## 2019-03-22 NOTE — ANESTHESIA PREPROCEDURE EVALUATION
Review of Systems/Medical History  Patient summary reviewed  Chart reviewed  No history of anesthetic complications     Cardiovascular  EKG reviewed, Hyperlipidemia,    Pulmonary  Negative pulmonary ROS        GI/Hepatic    GERD well controlled,        Negative  ROS        Endo/Other  Negative endo/other ROS      GYN  Negative gynecology ROS          Hematology  Negative hematology ROS      Musculoskeletal    Arthritis     Neurology  Negative neurology ROS      Psychology   Negative psychology ROS              Physical Exam    Airway    Mallampati score: I  TM Distance: >3 FB  Neck ROM: full     Dental   Comment: partial, upper dentures and lower dentures,     Cardiovascular      Pulmonary  Breath sounds clear to auscultation,     Other Findings  Upper and lower partials      Anesthesia Plan  ASA Score- 2     Anesthesia Type- IV sedation with anesthesia with ASA Monitors  Additional Monitors:   Airway Plan:     Comment: GA prn  Plan Factors-Patient not instructed to abstain from smoking on day of procedure  Patient did not smoke on day of surgery  Induction- intravenous  Postoperative Plan-     Informed Consent- Anesthetic plan and risks discussed with patient

## 2019-05-13 ENCOUNTER — OFFICE VISIT (OUTPATIENT)
Dept: PODIATRY | Facility: CLINIC | Age: 74
End: 2019-05-13

## 2019-05-13 VITALS
HEART RATE: 72 BPM | SYSTOLIC BLOOD PRESSURE: 137 MMHG | DIASTOLIC BLOOD PRESSURE: 61 MMHG | WEIGHT: 158 LBS | BODY MASS INDEX: 25.39 KG/M2 | HEIGHT: 66 IN

## 2019-05-13 DIAGNOSIS — M20.41 HAMMER TOE OF RIGHT FOOT: Primary | ICD-10-CM

## 2019-05-13 PROCEDURE — 1124F ACP DISCUSS-NO DSCNMKR DOCD: CPT | Performed by: PODIATRIST

## 2019-05-13 PROCEDURE — 99024 POSTOP FOLLOW-UP VISIT: CPT | Performed by: PODIATRIST

## 2019-07-08 DIAGNOSIS — Z12.31 ENCOUNTER FOR SCREENING MAMMOGRAM FOR MALIGNANT NEOPLASM OF BREAST: ICD-10-CM

## 2019-09-27 ENCOUNTER — OFFICE VISIT (OUTPATIENT)
Dept: PODIATRY | Facility: CLINIC | Age: 74
End: 2019-09-27
Payer: MEDICARE

## 2019-09-27 VITALS
DIASTOLIC BLOOD PRESSURE: 79 MMHG | BODY MASS INDEX: 25.39 KG/M2 | HEIGHT: 66 IN | HEART RATE: 95 BPM | WEIGHT: 158 LBS | SYSTOLIC BLOOD PRESSURE: 119 MMHG

## 2019-09-27 DIAGNOSIS — M20.41 HAMMER TOE OF RIGHT FOOT: ICD-10-CM

## 2019-09-27 DIAGNOSIS — R60.0 LOCALIZED EDEMA: Primary | ICD-10-CM

## 2019-09-27 PROCEDURE — 20552 NJX 1/MLT TRIGGER POINT 1/2: CPT | Performed by: PODIATRIST

## 2019-09-27 RX ORDER — LIDOCAINE HYDROCHLORIDE 10 MG/ML
1 INJECTION, SOLUTION INFILTRATION; PERINEURAL ONCE
Status: COMPLETED | OUTPATIENT
Start: 2019-09-27 | End: 2019-09-27

## 2019-09-27 RX ORDER — TRIAMCINOLONE ACETONIDE 40 MG/ML
20 INJECTION, SUSPENSION INTRA-ARTICULAR; INTRAMUSCULAR ONCE
Status: COMPLETED | OUTPATIENT
Start: 2019-09-27 | End: 2019-09-27

## 2019-09-27 RX ADMIN — LIDOCAINE HYDROCHLORIDE 1 ML: 10 INJECTION, SOLUTION INFILTRATION; PERINEURAL at 14:40

## 2019-09-27 RX ADMIN — TRIAMCINOLONE ACETONIDE 20 MG: 40 INJECTION, SUSPENSION INTRA-ARTICULAR; INTRAMUSCULAR at 14:40

## 2019-09-27 NOTE — PROGRESS NOTES
Patient presents approximately 6 months post hammertoe correction right 2nd toe  Patient is having difficulty with swelling of the toe  She states that there is no swelling when she starts out her day but the swelling increases with increased weight-bearing  Due to this chronic edema, recommended trigger point injection of Kenalog 40 at the base of the digit  Procedure as follows: Anesthesia via 2 cc of 1% xylocaine followed by 0 5 cc Kenalog 40 to the base of the right 2nd toe  Explained to patient that she will be seen in 1 month and that the swelling should be improved  Foot injection     Date/Time 9/27/2019 2:40 PM     Performed by  Mary Monge DPM     Authorized by Mary Monge DPM      Universal Protocol Consent: Verbal consent obtained  Risks and benefits: risks, benefits and alternatives were discussed  Consent given by: patient  Patient understanding: patient states understanding of the procedure being performed  Patient identity confirmed: verbally with patient        Site preparation: Isopropyl alcohol    Local anesthesia used: yes     Anesthesia   Local anesthesia used: yes  Local Anesthetic: lidocaine 1% without epinephrine     Procedure Details   Procedure Notes: Injected trigger point of base right 2nd toe with 0 5 cc Kenalog 40 along with 1 cc 1% xylocaine

## 2019-10-21 ENCOUNTER — ANNUAL EXAM (OUTPATIENT)
Dept: OBGYN CLINIC | Facility: CLINIC | Age: 74
End: 2019-10-21
Payer: MEDICARE

## 2019-10-21 VITALS
SYSTOLIC BLOOD PRESSURE: 126 MMHG | WEIGHT: 157 LBS | HEIGHT: 67 IN | DIASTOLIC BLOOD PRESSURE: 66 MMHG | BODY MASS INDEX: 24.64 KG/M2

## 2019-10-21 DIAGNOSIS — Z01.419 ENCOUNTER FOR GYNECOLOGICAL EXAMINATION (GENERAL) (ROUTINE) WITHOUT ABNORMAL FINDINGS: ICD-10-CM

## 2019-10-21 DIAGNOSIS — N76.1 CHRONIC VAGINITIS: Primary | ICD-10-CM

## 2019-10-21 DIAGNOSIS — Z12.31 ENCOUNTER FOR SCREENING MAMMOGRAM FOR MALIGNANT NEOPLASM OF BREAST: Primary | ICD-10-CM

## 2019-10-21 PROCEDURE — G0101 CA SCREEN;PELVIC/BREAST EXAM: HCPCS | Performed by: OBSTETRICS & GYNECOLOGY

## 2019-10-21 PROCEDURE — 87070 CULTURE OTHR SPECIMN AEROBIC: CPT | Performed by: OBSTETRICS & GYNECOLOGY

## 2019-10-21 NOTE — PATIENT INSTRUCTIONS
This 60-year-old patient is seen for some is is mild does vulvar irritation which may be related to her using a liner  daily for bowel incontinence  A vaginal culture was taken at this visit

## 2019-10-21 NOTE — PROGRESS NOTES
Assessment/Plan: This 66-year-old patient has had some vulvar irritation recently  She does wear a liner every day for bowel incontinence and urgency  Diagnoses and all orders for this visit:    Encounter for screening mammogram for malignant neoplasm of breast  -     Mammo screening bilateral w 3d & cad; Future    Encounter for gynecological examination (general) (routine) without abnormal findings  -     Liquid-based pap, screening          Subjective:     Patient ID: Kori Garcia is a 76 y o  female  HPI this 66-year-old patient has had no vaginal bleeding over the past year  She has notice some local irritation in the vulvar area recently  She does wear liners daily for bowel incontinence and this may play a role in some vulvar irritation  Munson is uncomfortable and she occasionally uses a lubricant  She is not using Estrace vaginal cream     Review of Systems      Objective:     Physical Exam  pelvic exam reveals atrophic vaginal mucosa with a small amount of a whitish discharge in the posterior vagina  The vulva is normal in appearance it is not swollen or red  Rectal exam reveals some week muscle between the rectum and vagina  There is no blood or masses in the rectum

## 2019-10-23 ENCOUNTER — TELEPHONE (OUTPATIENT)
Dept: OBGYN CLINIC | Facility: CLINIC | Age: 74
End: 2019-10-23

## 2019-10-23 LAB — BACTERIA GENITAL AEROBE CULT: NORMAL

## 2019-10-23 NOTE — TELEPHONE ENCOUNTER
----- Message from Jim Giordano MD sent at 10/23/2019 11:28 AM EDT -----  Please call the patient regarding her vaginal culture  The culture did not show evidence of monilia or bacterial infection

## 2019-10-24 ENCOUNTER — OFFICE VISIT (OUTPATIENT)
Dept: PODIATRY | Facility: CLINIC | Age: 74
End: 2019-10-24
Payer: MEDICARE

## 2019-10-24 VITALS
SYSTOLIC BLOOD PRESSURE: 136 MMHG | DIASTOLIC BLOOD PRESSURE: 85 MMHG | HEIGHT: 66 IN | HEART RATE: 84 BPM | WEIGHT: 158 LBS | BODY MASS INDEX: 25.39 KG/M2

## 2019-10-24 DIAGNOSIS — M20.41 HAMMER TOE OF RIGHT FOOT: ICD-10-CM

## 2019-10-24 DIAGNOSIS — R60.0 LOCALIZED EDEMA: Primary | ICD-10-CM

## 2019-10-24 PROCEDURE — 99212 OFFICE O/P EST SF 10 MIN: CPT | Performed by: PODIATRIST

## 2019-10-24 NOTE — PROGRESS NOTES
Patient presents for assessment of right 2nd toe  Patient had been having difficulty with hammertoe correction and swelling  Cortisone injection was given 1 month ago and it resolved the problem  No discomfort related  No additional treatment is needed  Jovanna cronin

## 2020-01-09 ENCOUNTER — HOSPITAL ENCOUNTER (OUTPATIENT)
Dept: RADIOLOGY | Facility: HOSPITAL | Age: 75
Discharge: HOME | End: 2020-01-09
Payer: MEDICARE

## 2020-01-09 ENCOUNTER — OFFICE VISIT (OUTPATIENT)
Dept: OBGYN CLINIC | Facility: HOSPITAL | Age: 75
End: 2020-01-09
Payer: MEDICARE

## 2020-01-09 VITALS
HEART RATE: 97 BPM | WEIGHT: 158 LBS | SYSTOLIC BLOOD PRESSURE: 134 MMHG | DIASTOLIC BLOOD PRESSURE: 83 MMHG | BODY MASS INDEX: 25.39 KG/M2 | HEIGHT: 66 IN

## 2020-01-09 DIAGNOSIS — M25.562 ACUTE PAIN OF LEFT KNEE: Primary | ICD-10-CM

## 2020-01-09 DIAGNOSIS — M25.562 ACUTE PAIN OF LEFT KNEE: ICD-10-CM

## 2020-01-09 DIAGNOSIS — M17.12 PRIMARY OSTEOARTHRITIS OF LEFT KNEE: ICD-10-CM

## 2020-01-09 PROCEDURE — 73562 X-RAY EXAM OF KNEE 3: CPT

## 2020-01-09 PROCEDURE — 99213 OFFICE O/P EST LOW 20 MIN: CPT | Performed by: PHYSICIAN ASSISTANT

## 2020-01-09 NOTE — PROGRESS NOTES
Assessment/Plan   Diagnoses and all orders for this visit:    Acute pain of left knee  -     XR knee 3 vw left non injury; Future    Primary osteoarthritis of left knee    - She would be a candidate for a cortisone injection but I held off on doing this today because she is asymptomatic     - She prefers to follow up on a prn basis    -Ok to return to daily walking or using the exercise bike  Subjective   Patient ID: Chantelle Sanchez is a 76 y o  female  Vitals:    01/09/20 0857   BP: 134/83   Pulse: 80     75yo female comes in for an evaluation of her left knee  She has been having pain for 2 weeks with no specific injury  She started having lateral knee pain while walking to her car one day  Since then, her pain has resolved  The pain when she had it, was dull in character, mild in severity, pain did not radiate and was not associated with numbness  The following portions of the patient's history were reviewed and updated as appropriate: allergies, current medications, past family history, past medical history, past social history, past surgical history and problem list     Review of Systems  Ortho Exam  Past Medical History:   Diagnosis Date    Arthritis     "hands"    Foot pain, right     GERD (gastroesophageal reflux disease)     Hyperlipidemia     Osteopenia     "borderline"    Positional vertigo     not recently    Wears glasses     Wears partial dentures      Past Surgical History:   Procedure Laterality Date    BREAST LUMPECTOMY      CHOLECYSTECTOMY      FOOT SURGERY      left foot big toe implant    KNEE SURGERY      [place] COLONOSCOPY FLX DX W/COLLJ SPEC WHEN PFRMD N/A 12/1/2017    Procedure: COLONOSCOPY;  Surgeon: Cuate Wu MD;  Location: [facility];   Service: Colorectal    AZ OPEN TX METATARSOPHALANGEAL JOINT DISLOCATION Right 3/22/2019    Procedure: 2ND MPJ SUBLAXATION REPAIR;  Surgeon: Ric Robert DPM;  Location: [facility];  Service: [address] [place] [address] OF MIR,ONE Right 3/22/2019    Procedure: HAMMERTOE CORRECTION;  Surgeon: Ana Paula Brunner DPM;  Location: [facility];  Service: Podiatry     History reviewed  No pertinent family history  Social History     Occupational History    Not on file   Tobacco Use    Smoking status: Never Smoker    Smokeless tobacco: Never Used   Substance and Sexual Activity    Alcohol use: Yes     Alcohol/week: 1 0 standard drinks     Types: 1 Glasses of wine per week     Frequency: 2-3 times a week     Drinks per session: 1 or 2     Binge frequency: Never     Comment: socially    Drug use: No    Sexual activity: Yes     Partners: Male       Review of Systems   Constitutional: Negative  HENT: Negative  Eyes: Negative  Respiratory: Negative  Cardiovascular: Negative  Gastrointestinal: Negative  Endocrine: Negative  Genitourinary: Negative  Musculoskeletal: As below      Allergic/Immunologic: Negative  Neurological: Negative  Hematological: Negative  Psychiatric/Behavioral: Negative  Objective   Physical Exam    · Constitutional: Awake, Alert, Oriented  · Eyes: EOMI  · Psych: Mood and affect appropriate  · Heart: regular rate and rhythm  · Lungs: No audible wheezing  · Abdomen: soft  · Lymph: no lymphedema   left Knee:  - Appearance   + arthritic deformity  - Effusion   none  - Palpation   No tenderness about the medial / lateral joint line, patella, patellar tendon, MCL, LCL, hamstrings, or medial / lateral tibial plateau   - ROM   Extension: 5 and Flexion: 120  - Special Tests   Clara's Test negative, Anterior Drawer Test negative, Posterior Drawer Test negative, Valgus Stress Test negative and Varus Stress Test negative  - Motor   normal 5/5 in all planes  - NVI distally    I have personally reviewed pertinent films in PACS and my interpretation is Severe osteoarthritis, bone on bone

## 2020-07-09 DIAGNOSIS — Z12.31 ENCOUNTER FOR SCREENING MAMMOGRAM FOR MALIGNANT NEOPLASM OF BREAST: ICD-10-CM

## 2020-11-02 ENCOUNTER — LAB (OUTPATIENT)
Dept: LAB | Facility: HOSPITAL | Age: 75
End: 2020-11-02
Payer: MEDICARE

## 2020-11-02 DIAGNOSIS — E78.5 HYPERLIPIDEMIA, UNSPECIFIED HYPERLIPIDEMIA TYPE: Primary | ICD-10-CM

## 2020-11-02 LAB
25(OH)D3 SERPL-MCNC: 56.8 NG/ML (ref 30–100)
ALBUMIN SERPL BCP-MCNC: 3.7 G/DL (ref 3.5–5)
ALP SERPL-CCNC: 72 U/L (ref 46–116)
ALT SERPL W P-5'-P-CCNC: 26 U/L (ref 12–78)
ANION GAP SERPL CALCULATED.3IONS-SCNC: 6 MMOL/L (ref 4–13)
AST SERPL W P-5'-P-CCNC: 16 U/L (ref 5–45)
BASOPHILS # BLD AUTO: 0.03 THOUSANDS/ΜL (ref 0–0.1)
BASOPHILS NFR BLD AUTO: 1 % (ref 0–1)
BILIRUB SERPL-MCNC: 0.62 MG/DL (ref 0.2–1)
BUN SERPL-MCNC: 12 MG/DL (ref 5–25)
CALCIUM SERPL-MCNC: 8.9 MG/DL (ref 8.3–10.1)
CHLORIDE SERPL-SCNC: 108 MMOL/L (ref 100–108)
CHOLEST SERPL-MCNC: 171 MG/DL (ref 50–200)
CO2 SERPL-SCNC: 27 MMOL/L (ref 21–32)
CREAT SERPL-MCNC: 0.74 MG/DL (ref 0.6–1.3)
EOSINOPHIL # BLD AUTO: 0.23 THOUSAND/ΜL (ref 0–0.61)
EOSINOPHIL NFR BLD AUTO: 5 % (ref 0–6)
ERYTHROCYTE [DISTWIDTH] IN BLOOD BY AUTOMATED COUNT: 13.1 % (ref 11.6–15.1)
GFR SERPL CREATININE-BSD FRML MDRD: 80 ML/MIN/1.73SQ M
GLUCOSE P FAST SERPL-MCNC: 94 MG/DL (ref 65–99)
HCT VFR BLD AUTO: 42.6 % (ref 34.8–46.1)
HDLC SERPL-MCNC: 85 MG/DL
HGB BLD-MCNC: 13.5 G/DL (ref 11.5–15.4)
IMM GRANULOCYTES # BLD AUTO: 0.01 THOUSAND/UL (ref 0–0.2)
IMM GRANULOCYTES NFR BLD AUTO: 0 % (ref 0–2)
LDLC SERPL CALC-MCNC: 69 MG/DL (ref 0–100)
LYMPHOCYTES # BLD AUTO: 1.01 THOUSANDS/ΜL (ref 0.6–4.47)
LYMPHOCYTES NFR BLD AUTO: 22 % (ref 14–44)
MCH RBC QN AUTO: 28.7 PG (ref 26.8–34.3)
MCHC RBC AUTO-ENTMCNC: 31.7 G/DL (ref 31.4–37.4)
MCV RBC AUTO: 91 FL (ref 82–98)
MONOCYTES # BLD AUTO: 0.53 THOUSAND/ΜL (ref 0.17–1.22)
MONOCYTES NFR BLD AUTO: 12 % (ref 4–12)
NEUTROPHILS # BLD AUTO: 2.78 THOUSANDS/ΜL (ref 1.85–7.62)
NEUTS SEG NFR BLD AUTO: 60 % (ref 43–75)
NONHDLC SERPL-MCNC: 86 MG/DL
NRBC BLD AUTO-RTO: 0 /100 WBCS
PLATELET # BLD AUTO: 233 THOUSANDS/UL (ref 149–390)
PMV BLD AUTO: 11 FL (ref 8.9–12.7)
POTASSIUM SERPL-SCNC: 3.8 MMOL/L (ref 3.5–5.3)
PROT SERPL-MCNC: 7.5 G/DL (ref 6.4–8.2)
RBC # BLD AUTO: 4.7 MILLION/UL (ref 3.81–5.12)
SODIUM SERPL-SCNC: 141 MMOL/L (ref 136–145)
TRIGL SERPL-MCNC: 86 MG/DL
TSH SERPL DL<=0.05 MIU/L-ACNC: 2.09 UIU/ML (ref 0.36–3.74)
WBC # BLD AUTO: 4.59 THOUSAND/UL (ref 4.31–10.16)

## 2020-11-02 PROCEDURE — 80061 LIPID PANEL: CPT

## 2020-11-02 PROCEDURE — 36415 COLL VENOUS BLD VENIPUNCTURE: CPT

## 2020-11-02 PROCEDURE — 82306 VITAMIN D 25 HYDROXY: CPT

## 2020-11-02 PROCEDURE — 84443 ASSAY THYROID STIM HORMONE: CPT

## 2020-11-02 PROCEDURE — 80053 COMPREHEN METABOLIC PANEL: CPT

## 2020-11-02 PROCEDURE — 85025 COMPLETE CBC W/AUTO DIFF WBC: CPT

## 2020-12-11 ENCOUNTER — TRANSCRIBE ORDERS (OUTPATIENT)
Dept: LAB | Facility: HOSPITAL | Age: 75
End: 2020-12-11

## 2020-12-11 ENCOUNTER — LAB (OUTPATIENT)
Dept: LAB | Facility: HOSPITAL | Age: 75
End: 2020-12-11
Payer: MEDICARE

## 2020-12-11 DIAGNOSIS — E78.5 HYPERLIPIDEMIA, UNSPECIFIED HYPERLIPIDEMIA TYPE: ICD-10-CM

## 2020-12-11 DIAGNOSIS — E78.5 HYPERLIPIDEMIA, UNSPECIFIED HYPERLIPIDEMIA TYPE: Primary | ICD-10-CM

## 2020-12-11 LAB
ALBUMIN SERPL BCP-MCNC: 3.7 G/DL (ref 3.5–5)
ALP SERPL-CCNC: 73 U/L (ref 46–116)
ALT SERPL W P-5'-P-CCNC: 27 U/L (ref 12–78)
ANION GAP SERPL CALCULATED.3IONS-SCNC: 4 MMOL/L (ref 4–13)
AST SERPL W P-5'-P-CCNC: 13 U/L (ref 5–45)
BILIRUB SERPL-MCNC: 0.55 MG/DL (ref 0.2–1)
BUN SERPL-MCNC: 13 MG/DL (ref 5–25)
CALCIUM SERPL-MCNC: 9.2 MG/DL (ref 8.3–10.1)
CHLORIDE SERPL-SCNC: 108 MMOL/L (ref 100–108)
CO2 SERPL-SCNC: 29 MMOL/L (ref 21–32)
CREAT SERPL-MCNC: 0.72 MG/DL (ref 0.6–1.3)
GFR SERPL CREATININE-BSD FRML MDRD: 82 ML/MIN/1.73SQ M
GLUCOSE P FAST SERPL-MCNC: 92 MG/DL (ref 65–99)
LDLC SERPL DIRECT ASSAY-MCNC: 145 MG/DL (ref 0–100)
POTASSIUM SERPL-SCNC: 3.9 MMOL/L (ref 3.5–5.3)
PROT SERPL-MCNC: 7.4 G/DL (ref 6.4–8.2)
SODIUM SERPL-SCNC: 141 MMOL/L (ref 136–145)

## 2020-12-11 PROCEDURE — 80053 COMPREHEN METABOLIC PANEL: CPT

## 2020-12-11 PROCEDURE — 83721 ASSAY OF BLOOD LIPOPROTEIN: CPT

## 2020-12-11 PROCEDURE — 36415 COLL VENOUS BLD VENIPUNCTURE: CPT

## 2021-01-16 ENCOUNTER — IMMUNIZATIONS (OUTPATIENT)
Dept: FAMILY MEDICINE CLINIC | Facility: HOSPITAL | Age: 76
End: 2021-01-16

## 2021-01-16 DIAGNOSIS — Z23 ENCOUNTER FOR IMMUNIZATION: Primary | ICD-10-CM

## 2021-01-16 PROCEDURE — 0001A SARS-COV-2 / COVID-19 MRNA VACCINE (PFIZER-BIONTECH) 30 MCG: CPT

## 2021-01-16 PROCEDURE — 91300 SARS-COV-2 / COVID-19 MRNA VACCINE (PFIZER-BIONTECH) 30 MCG: CPT

## 2021-02-04 ENCOUNTER — IMMUNIZATIONS (OUTPATIENT)
Dept: FAMILY MEDICINE CLINIC | Facility: HOSPITAL | Age: 76
End: 2021-02-04

## 2021-02-04 DIAGNOSIS — Z23 ENCOUNTER FOR IMMUNIZATION: Primary | ICD-10-CM

## 2021-02-04 PROCEDURE — 0002A SARS-COV-2 / COVID-19 MRNA VACCINE (PFIZER-BIONTECH) 30 MCG: CPT

## 2021-02-04 PROCEDURE — 91300 SARS-COV-2 / COVID-19 MRNA VACCINE (PFIZER-BIONTECH) 30 MCG: CPT

## 2021-02-15 ENCOUNTER — LAB (OUTPATIENT)
Dept: LAB | Facility: HOSPITAL | Age: 76
End: 2021-02-15
Payer: MEDICARE

## 2021-02-15 ENCOUNTER — TRANSCRIBE ORDERS (OUTPATIENT)
Dept: LAB | Facility: HOSPITAL | Age: 76
End: 2021-02-15

## 2021-02-15 DIAGNOSIS — E78.5 HYPERLIPIDEMIA, UNSPECIFIED HYPERLIPIDEMIA TYPE: ICD-10-CM

## 2021-02-15 DIAGNOSIS — E78.5 HYPERLIPIDEMIA, UNSPECIFIED HYPERLIPIDEMIA TYPE: Primary | ICD-10-CM

## 2021-02-15 LAB
CHOLEST SERPL-MCNC: 220 MG/DL (ref 50–200)
HDLC SERPL-MCNC: 84 MG/DL
LDLC SERPL CALC-MCNC: 110 MG/DL (ref 0–100)
TRIGL SERPL-MCNC: 129 MG/DL

## 2021-02-15 PROCEDURE — 36415 COLL VENOUS BLD VENIPUNCTURE: CPT

## 2021-02-15 PROCEDURE — 80061 LIPID PANEL: CPT

## 2021-07-22 ENCOUNTER — HOSPITAL ENCOUNTER (EMERGENCY)
Facility: HOSPITAL | Age: 76
Discharge: HOME | End: 2021-07-22
Attending: EMERGENCY MEDICINE | Admitting: EMERGENCY MEDICINE
Payer: MEDICARE

## 2021-07-22 ENCOUNTER — APPOINTMENT (EMERGENCY)
Dept: RADIOLOGY | Facility: HOSPITAL | Age: 76
End: 2021-07-22
Payer: MEDICARE

## 2021-07-22 VITALS
HEART RATE: 78 BPM | DIASTOLIC BLOOD PRESSURE: 68 MMHG | WEIGHT: 158 LBS | SYSTOLIC BLOOD PRESSURE: 139 MMHG | BODY MASS INDEX: 25.5 KG/M2 | RESPIRATION RATE: 18 BRPM | TEMPERATURE: 97.5 F | OXYGEN SATURATION: 97 %

## 2021-07-22 DIAGNOSIS — S82.002A LEFT PATELLA FRACTURE: Primary | ICD-10-CM

## 2021-07-22 PROCEDURE — 73564 X-RAY EXAM KNEE 4 OR MORE: CPT

## 2021-07-22 PROCEDURE — NC001 PR NO CHARGE: Performed by: ORTHOPAEDIC SURGERY

## 2021-07-22 PROCEDURE — 99284 EMERGENCY DEPT VISIT MOD MDM: CPT | Performed by: EMERGENCY MEDICINE

## 2021-07-22 PROCEDURE — 99283 EMERGENCY DEPT VISIT LOW MDM: CPT

## 2021-07-22 RX ORDER — ACETAMINOPHEN 325 MG/1
650 TABLET ORAL ONCE
Status: COMPLETED | OUTPATIENT
Start: 2021-07-22 | End: 2021-07-22

## 2021-07-22 RX ORDER — LIDOCAINE HYDROCHLORIDE 10 MG/ML
20 INJECTION, SOLUTION EPIDURAL; INFILTRATION; INTRACAUDAL; PERINEURAL ONCE
Status: CANCELLED | OUTPATIENT
Start: 2021-07-22 | End: 2021-07-22

## 2021-07-22 RX ORDER — LIDOCAINE HYDROCHLORIDE 10 MG/ML
20 INJECTION, SOLUTION EPIDURAL; INFILTRATION; INTRACAUDAL; PERINEURAL ONCE
Status: COMPLETED | OUTPATIENT
Start: 2021-07-22 | End: 2021-07-22

## 2021-07-22 RX ADMIN — ACETAMINOPHEN 650 MG: 325 TABLET, FILM COATED ORAL at 17:34

## 2021-07-22 RX ADMIN — LIDOCAINE HYDROCHLORIDE 20 ML: 10 INJECTION, SOLUTION EPIDURAL; INFILTRATION; INTRACAUDAL at 19:40

## 2021-07-22 NOTE — ED ATTENDING ATTESTATION
7/22/2021  IMilad MD, saw and evaluated the patient  I have discussed the patient with the resident/non-physician practitioner and agree with the resident's/non-physician practitioner's findings, Plan of Care, and MDM as documented in the resident's/non-physician practitioner's note, except where noted  All available labs and Radiology studies were reviewed  I was present for key portions of any procedure(s) performed by the resident/non-physician practitioner and I was immediately available to provide assistance  At this point I agree with the current assessment done in the Emergency Department    I have conducted an independent evaluation of this patient a history and physical is as follows:  Patient tripped and suffered a mechanical fall onto both of her knees she injured her left knee primarily she does have a bruise on her right knee  Exam there is tenderness and swelling with abrasion anterior knees bilaterally however left is more significant she is able to lift her heel against gravity the knee is held in a mild amount of flexion and she is unable to extend completely there is tenderness over the  patella there is no effusion also noted neurovascular status intact  X-ray reveals a nondisplaced patellar fracture  Will have Ortho review x-ray  Patient will be placed on crutches  ED Course         Critical Care Time  Procedures

## 2021-07-23 NOTE — DISCHARGE INSTRUCTIONS
Discharge Instructions - Orthopedics  Silviano Patten 68 y o  female MRN: [id number]  Unit/Bed#: X ray    Weight Bearing Status:                                           Weightbearing as tolerated in the left lower extremity with a knee immobilizer    Pain:  Tylenol and Advil as needed    Appt Instructions: If you do not have your appointment, please call the clinic at [phone] to follow-up with Dr Marcos Aviles In 10 days  Otherwise followup as scheduled     Contact the office sooner if you experience any increased numbness/tingling in the extremities        Miscellaneous:  She may remove your bandage in 2 days

## 2021-07-23 NOTE — ED PROVIDER NOTES
History  Chief Complaint   Patient presents with    Fall     pt slipped tripped over a rake falling to knees  abrasions noted to both knees with swelling  denies head strike  no thinners  68-year-old female presents to the emergency department for evaluation after a fall  The patient reports that she was at home and tripped over a rake falling to the ground on her knees and right elbow  The patient denies head strike, loss of consciousness or using any anti-platelet or anticoagulation medication  She states that she was ambulatory after the fall but was having significant difficulty bearing weight on her left leg secondary to knee pain so came to the emergency department for further evaluation  The patient took Motrin to treat her pain prior to coming to the emergency department  She denies headache, blurry vision and localized numbness, tingling or weakness  Prior to Admission Medications   Prescriptions Last Dose Informant Patient Reported? Taking? Ascorbic Acid (VITAMIN C PO)  Self Yes No   Sig: Take by mouth   Calcium Citrate (CITRACAL PO)   Yes No   Sig: Take by mouth daily   cholecalciferol (VITAMIN D3) 1,000 units tablet  Self Yes No   Sig: Take 1,000 Units by mouth daily   multivitamin-iron-minerals-folic acid (CENTRUM) chewable tablet  Self Yes No   Sig: Chew 1 tablet daily   simvastatin (ZOCOR) 10 mg tablet  Self Yes No   Sig: Take 10 mg by mouth daily at bedtime        Facility-Administered Medications: None       Past Medical History:   Diagnosis Date    Arthritis     "hands"    Foot pain, right     GERD (gastroesophageal reflux disease)     Hyperlipidemia     Osteopenia     "borderline"    Positional vertigo     not recently    Wears glasses     Wears partial dentures        Past Surgical History:   Procedure Laterality Date    BREAST LUMPECTOMY      CHOLECYSTECTOMY      FOOT SURGERY      left foot big toe implant    KNEE SURGERY      VA COLONOSCOPY FLX DX W/COLLJ SPEC WHEN PFRMD N/A 12/1/2017    Procedure: COLONOSCOPY;  Surgeon: Michelle Condon MD;  [facility]; Service: Colorectal    FL OPEN TX METATARSOPHALANGEAL JOINT DISLOCATION Right 3/22/2019    Procedure: 2ND MPJ SUBLAXATION REPAIR;  Surgeon: Artem Esparza DPM;  [facility];  Service: Podiatry    FL REPAIR OF Dene Ing Right 3/22/2019    Procedure: HAMMERTOE CORRECTION;  Surgeon: Artem Esparza DPM;  [facility];  Service: Podiatry       No family history on file  I have reviewed and agree with the history as documented  E-Cigarette/Vaping     E-Cigarette/Vaping Substances     Social History     Tobacco Use    Smoking status: Never Smoker    Smokeless tobacco: Never Used   Substance Use Topics    Alcohol use: Yes     Alcohol/week: 1 0 standard drinks     Types: 1 Glasses of wine per week     Comment: socially    Drug use: No        Review of Systems   Constitutional: Negative for chills and fever  HENT: Negative for ear pain and sore throat  Eyes: Negative for pain and visual disturbance  Respiratory: Negative for cough and shortness of breath  Cardiovascular: Negative for chest pain and palpitations  Gastrointestinal: Negative for abdominal pain and vomiting  Genitourinary: Negative for dysuria and hematuria  Musculoskeletal: Positive for gait problem  Negative for arthralgias and back pain  Skin: Positive for wound  Negative for color change and rash  Neurological: Negative for seizures and syncope  All other systems reviewed and are negative        Physical Exam  ED Triage Vitals   Temperature Pulse Respirations Blood Pressure SpO2   07/22/21 1605 07/22/21 1605 07/22/21 1605 07/22/21 1605 07/22/21 1605   97 5 °F (36 4 °C) 89 18 152/87 96 %      Temp Source Heart Rate Source Patient Position - Orthostatic VS BP Location FiO2 (%)   07/22/21 1605 07/22/21 1605 07/22/21 1848 07/22/21 1848 --   Oral Monitor Sitting Right arm       Pain Score       07/22/21 1734       1             Orthostatic Vital Signs  Vitals:    07/22/21 1605 07/22/21 1848   BP: 152/87 139/68   Pulse: 89 78   Patient Position - Orthostatic VS:  Sitting       Physical Exam  Vitals and nursing note reviewed  Constitutional:       General: She is not in acute distress  Appearance: She is well-developed  HENT:      Head: Normocephalic and atraumatic  No raccoon eyes or Aguilar's sign  Nose:      Right Nostril: No septal hematoma  Left Nostril: No septal hematoma  Eyes:      Conjunctiva/sclera: Conjunctivae normal    Cardiovascular:      Rate and Rhythm: Normal rate and regular rhythm  Pulses:           Dorsalis pedis pulses are 2+ on the right side and 2+ on the left side  Heart sounds: No murmur heard  Pulmonary:      Effort: Pulmonary effort is normal  No respiratory distress  Breath sounds: Normal breath sounds  Abdominal:      Palpations: Abdomen is soft  Tenderness: There is no abdominal tenderness  Musculoskeletal:      Right elbow: No swelling  Normal range of motion  Left elbow: Normal         Arms:       Cervical back: Normal and neck supple  Thoracic back: Normal       Lumbar back: Normal       Right knee: No swelling  Normal range of motion  Normal pulse  Instability Tests: Anterior drawer test negative  Posterior drawer test negative  Left knee: Swelling present  No deformity  Decreased range of motion  Tenderness present  Normal pulse  Instability Tests: Anterior drawer test negative  Posterior drawer test negative  Legs:    Skin:     General: Skin is warm and dry  Neurological:      Mental Status: She is alert and oriented to person, place, and time  GCS: GCS eye subscore is 4  GCS verbal subscore is 5  GCS motor subscore is 6  Cranial Nerves: Cranial nerves are intact  Sensory: Sensation is intact  Motor: Motor function is intact  Coordination: Coordination is intact           ED Medications  Medications   acetaminophen (TYLENOL) tablet 650 mg (650 mg Oral Given 7/22/21 1734)   lidocaine (PF) (XYLOCAINE-MPF) 1 % injection 20 mL (20 mL Infiltration Given 7/22/21 1940)       Diagnostic Studies  Results Reviewed     None                 XR knee 4+ views left injury   ED Interpretation by Berna Albert MD (07/22 5895)   Patella fracture      Final Result by Lexi Loredo MD (07/23 4961)      Patella fracture  Workstation performed: [id number]               Procedures  Procedures      ED Course                             SBIRT 20yo+      Most Recent Value   SBIRT (25 yo +)   In order to provide better care to our patients, we are screening all of our patients for alcohol and drug use  Would it be okay to ask you these screening questions? Yes Filed at: 07/22/2021 1712   Initial Alcohol Screen: US AUDIT-C    1  How often do you have a drink containing alcohol? 1 Filed at: 07/22/2021 1712   2  How many drinks containing alcohol do you have on a typical day you are drinking? 0 Filed at: 07/22/2021 1712   3b  FEMALE Any Age, or MALE 65+: How often do you have 4 or more drinks on one occassion? 0 Filed at: 07/22/2021 1712   Audit-C Score  1 Filed at: 07/22/2021 1712   CHELE: How many times in the past year have you    Used an illegal drug or used a prescription medication for non-medical reasons? Never Filed at: 07/22/2021 1712                MDM  Number of Diagnoses or Management Options  Left patella fracture  Diagnosis management comments: 51-year-old female presented to the emergency department for evaluation of left knee pain after a fall at home  On arrival the patient was awake, alert, oriented and in no acute distress  Initial vital signs were stable  On exam the patient had swelling, tenderness to palpation and decreased range of motion of her left knee  X-rays showed a left patella fracture    Orthopedic surgery was consulted who evaluated the patient at bedside  A left knee aspiration of 50 cc hemarthrosis and injection of lidocaine was done by orthopedic surgery  The patient was placed in a knee immobilizer  She is appropriate for discharge at this time with recommendation to follow up with Orthopedic surgery within 10 days  Return precautions were discussed  Patient agrees with the plan for discharge and feels comfortable to go home with proper f/u  Advised to return for worsening or additional problems  Diagnostic tests were reviewed and questions answered  Diagnosis, care plan and treatment options were discussed  The patient understands instructions and will follow up as directed  Disposition  Final diagnoses:   Left patella fracture     Time reflects when diagnosis was documented in both MDM as applicable and the Disposition within this note     Time User Action Codes Description Comment    7/22/2021  8:07 PM Audraia Lucas Add [[id number]] Left patella fracture       ED Disposition     ED Disposition Condition Date/Time Comment    Discharge Stable u Jul 22, 2021  8:07 PM Magali Solis discharge to home/self care              Follow-up Information     Follow up With Specialties Details Why Contact Info Additional Information    Elina Watts MD Orthopedic Surgery Schedule an appointment as soon as possible for a visit in 10 days  [facility] [phone]       [address] [place] [address] Emergency Department Emergency Medicine Go to  If symptoms worsen [address]  958 [facility] 64 [facility] Emergency Department, [address], [address] [phone]          Discharge Medication List as of 7/22/2021  8:10 PM      CONTINUE these medications which have NOT CHANGED    Details   Ascorbic Acid (VITAMIN C PO) Take by mouth, Historical Med      Calcium Citrate (CITRACAL PO) Take by mouth daily, Historical Med      cholecalciferol (VITAMIN D3) 1,000 units tablet Take 1,000 Units by mouth daily, Historical Med      multivitamin-iron-minerals-folic acid (CENTRUM) chewable tablet Chew 1 tablet daily, Historical Med      simvastatin (ZOCOR) 10 mg tablet Take 10 mg by mouth daily at bedtime  , Historical Med           No discharge procedures on file  PDMP Review     None           ED Provider  Attending physically available and evaluated Rick Nesbitt  AYUSH managed the patient along with the ED Attending      Electronically Signed by         Alicia Sifuentes MD  07/23/21 5434

## 2021-07-23 NOTE — CONSULTS
Orthopedics   Nakita Sands 68 y o  female MRN: [id number]  Unit/Bed#: X ray      Chief Complaint:   left knee pain    HPI:   68 y o  female status post a mechanical fall on her knees complaining of left knee pain and difficulty bearing weight  Pain is made worse with motion or contact to the area  Denies motor or sensory deficit  She ambulates without assistive devices  She lives with her   Denies taking blood thinners  Review Of Systems:   · Skin:  Abrasions  · Neuro: See HPI  · Musculoskeletal: See HPI  · 14 point review of systems negative except as stated above     Past Medical History:   Past Medical History:   Diagnosis Date    Arthritis     "hands"    Foot pain, right     GERD (gastroesophageal reflux disease)     Hyperlipidemia     Osteopenia     "borderline"    Positional vertigo     not recently    Wears glasses     Wears partial dentures        Past Surgical History:   Past Surgical History:   Procedure Laterality Date    BREAST LUMPECTOMY      CHOLECYSTECTOMY      FOOT SURGERY      left foot big toe implant    KNEE SURGERY      VA COLONOSCOPY FLX DX W/COLLJ SPEC WHEN PFRMD N/A 12/1/2017    Procedure: COLONOSCOPY;  Surgeon: Meri Phelan MD;  [facility]; Service: Colorectal    VA OPEN TX METATARSOPHALANGEAL JOINT DISLOCATION Right 3/22/2019    Procedure: 2ND MPJ SUBLAXATION REPAIR;  Surgeon: Theron Chong DPM;  [facility];  Service: Podiatry    VA REPAIR OF Josepha Rosston Right 3/22/2019    Procedure: HAMMERTOE CORRECTION;  Surgeon: Theron Chong DPM;  [facility];  Service: Podiatry       Family History:  Family history reviewed and non-contributory  No family history on file      Social History:  Social History     Socioeconomic History    Marital status: /Civil Union     Spouse name: Not on file    Number of children: Not on file    Years of education: Not on file    Highest education level: Not on file   Occupational History    Not on file   Tobacco Use    Smoking status: Never Smoker    Smokeless tobacco: Never Used   Substance and Sexual Activity    Alcohol use: Yes     Alcohol/week: 1 0 standard drinks     Types: 1 Glasses of wine per week     Comment: socially    Drug use: No    Sexual activity: Yes     Partners: Male   Other Topics Concern    Not on file   Social History Narrative    Not on file     Social Determinants of Health     Financial Resource Strain:     Difficulty of Paying Living Expenses:    Food Insecurity:     Worried About Running Out of Food in the Last Year:     [address] in the Last Year:    Transportation Needs:     Lack of Transportation (Medical):  Lack of Transportation (Non-Medical):    Physical Activity:     Days of Exercise per Week:     Minutes of Exercise per Session:    Stress:     Feeling of Stress :    Social Connections:     Frequency of Communication with Friends and Family:     Frequency of Social Gatherings with Friends and Family:     Attends Mu-ism Services:     Active Member of Clubs or Organizations:     Attends Club or Organization Meetings:     Marital Status:    Intimate Partner Violence:     Fear of Current or Ex-Partner:     Emotionally Abused:     Physically Abused:     Sexually Abused: Allergies:    Allergies   Allergen Reactions    Penicillins Anaphylaxis    Benadryl [Diphenhydramine]      Cant remember reaction           Labs:  0   Lab Value Date/Time    HCT 42 6 11/02/2020 0904    HCT 42 0 03/06/2019 1632    HCT 38 7 03/05/2016 0914    HCT 39 3 06/19/2015 0706    HCT 39 5 06/30/2014 1214    HGB 13 5 11/02/2020 0904    HGB 13 6 03/06/2019 1632    HGB 13 0 03/05/2016 0914    HGB 13 1 06/19/2015 0706    HGB 12 8 06/30/2014 1214    WBC 4 59 11/02/2020 0904    WBC 5 70 03/06/2019 1632    WBC 4 17 (L) 03/05/2016 0914    WBC 4 37 06/19/2015 0706    WBC 3 80 (L) 06/30/2014 1214       Meds:    Current Facility-Administered Medications:    tetanus-diphtheria-acellular pertussis (BOOSTRIX) IM injection 0 5 mL, 0 5 mL, Intramuscular, Once, Greer Echols MD    Current Outpatient Medications:     Ascorbic Acid (VITAMIN C PO), Take by mouth, Disp: , Rfl:     Calcium Citrate (CITRACAL PO), Take by mouth daily, Disp: , Rfl:     cholecalciferol (VITAMIN D3) 1,000 units tablet, Take 1,000 Units by mouth daily, Disp: , Rfl:     multivitamin-iron-minerals-folic acid (CENTRUM) chewable tablet, Chew 1 tablet daily, Disp: , Rfl:     simvastatin (ZOCOR) 10 mg tablet, Take 10 mg by mouth daily at bedtime  , Disp: , Rfl:     Blood Culture:   No results found for: BLOODCX    Wound Culture:   No results found for: WOUNDCULT    Ins and Outs:  No intake/output data recorded  Physical Exam:   /68 (BP Location: Right arm)   Pulse 78   Temp 97 5 °F (36 4 °C) (Oral)   Resp 18   Wt 71 7 kg (158 lb)   SpO2 97%   BMI 25 50 kg/m²   Gen: Alert and oriented to person, place, time  HEENT: EOMI, eyes clear, moist mucus membranes, hearing intact  Respiratory: Bilateral chest rise  No audible wheezing found  Cardiovascular: Regular Rate and Rhythm  Abdomen: soft nontender/nondistended  Musculoskeletal: left lower extremity  · Skin with superficial abrasion anterior knee  · Tender to palpation over the patella  · Moderate to large knee effusion  · Unable to perform a straight leg raise secondary to pain  · Sensation intact L3-S1  · Intact ankle dorsi/plantar flexion, EHL/FHL  · 2+ DP pulse    Procedure: left knee aspiration and injection  After sterile preparation of the skin overlying the knee local anesthetic was provided with 5cc of 1% lidocaine  An 18 gauge needle was then  inserted via a superior lateral portal   Approx 50cc of hemarthrosis was aspirated  The syringe was then exchanged and 10 cc of lidocaine was injected into the knee joint  Sterile dressing was then applied    Pt tolerated the procedure well and was neurovascularly intact both pre and post procedure  After arthrocentesis, pain was improved  She was able to perform straight leg raise without difficulty against gravity  Radiology:   I personally reviewed the films  X-rays left knee shows severe degenerative joint disease as evidenced by joint space narrowing, subchondral sclerosis, osteophyte formation with bone-on-bone apposition in the medial compartment and in the patellofemoral compartment  There is an incomplete nondisplaced patella fracture    _*_*_*_*_*_*_*_*_*_*_*_*_*_*_*_*_*_*_*_*_*_*_*_*_*_*_*_*_*_*_*_*_*_*_*_*_*_*_*_*_*    Assessment:  68 y o  female status post mechanical fall with nondisplaced left patella fracture and severe left knee DJD  Patient able to straight leg raise against gravity without difficulty after arthrocentesis  She was placed in a knee immobilizer      Plan:   · Weightbearing as tolerated in the left lower extremity with a knee immobilizer  · Plan for nonoperative management at this time  · Okay to discharge from emergency department  · Follow-up in 10-14 days in the office for evaluation with Dr Duane Meza MD

## 2021-08-02 ENCOUNTER — OFFICE VISIT (OUTPATIENT)
Dept: OBGYN CLINIC | Facility: OTHER | Age: 76
End: 2021-08-02
Payer: MEDICARE

## 2021-08-02 ENCOUNTER — APPOINTMENT (OUTPATIENT)
Dept: RADIOLOGY | Facility: OTHER | Age: 76
End: 2021-08-02
Payer: MEDICARE

## 2021-08-02 VITALS
DIASTOLIC BLOOD PRESSURE: 63 MMHG | BODY MASS INDEX: 25.02 KG/M2 | SYSTOLIC BLOOD PRESSURE: 129 MMHG | WEIGHT: 155 LBS | HEART RATE: 49 BPM

## 2021-08-02 DIAGNOSIS — S82.002A CLOSED NONDISPLACED FRACTURE OF LEFT PATELLA, UNSPECIFIED FRACTURE MORPHOLOGY, INITIAL ENCOUNTER: Primary | ICD-10-CM

## 2021-08-02 DIAGNOSIS — S82.002A CLOSED NONDISPLACED FRACTURE OF LEFT PATELLA, UNSPECIFIED FRACTURE MORPHOLOGY, INITIAL ENCOUNTER: ICD-10-CM

## 2021-08-02 PROCEDURE — 27520 TREAT KNEECAP FRACTURE: CPT | Performed by: ORTHOPAEDIC SURGERY

## 2021-08-02 PROCEDURE — 99214 OFFICE O/P EST MOD 30 MIN: CPT | Performed by: ORTHOPAEDIC SURGERY

## 2021-08-02 PROCEDURE — 73560 X-RAY EXAM OF KNEE 1 OR 2: CPT

## 2021-08-02 RX ORDER — IBUPROFEN 600 MG/1
TABLET ORAL
COMMUNITY
Start: 2021-05-19

## 2021-08-02 RX ORDER — PRAVASTATIN SODIUM 10 MG
10 TABLET ORAL DAILY
COMMUNITY
Start: 2021-07-10

## 2021-08-02 NOTE — PROGRESS NOTES
Assessment  Diagnoses and all orders for this visit:    Closed nondisplaced fracture of left patella, unspecified fracture morphology, initial encounter      Discussion and Plan:  · Repeat knee x-rays were obtained in the office today and reviewed with Nubia Germain  The show maintenance of reduction as well as continued to demonstrate severe tricompartmental osteoarthritis of the knee  · D/C knee immobilizer and transition to a long hinged knee brace for ambulation   · Knee brace will be 0-30 for 2 weeks then 0-60 x 2 weeks then 0-90x 2 weeks   · WBAT LLE with the use of the knee brace   · Developing slight flexion contracture, advised to work on knee extension while out of the brace   · Will take to PCP regarding osteoporosis evaluation and treatment options as she does state this is the 2nd time she has had a patella fracture so I do feel it is important for her to get recommendations from her primary care physician as to whether treatment should be initiated for osteoporosis   · Follow up in 6 weeks time with repeat left knee x-rays        Subjective:   Patient ID: Rebecca Morrow is a 68 y o  female      HPI    The patient presents with a chief complaint of left knee pain  The pain began aprox  2 week(s) ago and is associated with an acute injury  Nubia Germain states on 7/22/2021 she was doing yard work when she tripped over the rake falling onto the concrete  She presented to the ED following the injury, at which time x-rays were performed and she was provided with a knee immobilizer  She wears the knee brace for ambulation purposes  The patient describes the pain as dull in nature  It is intermittent in timing, and localizes the pain to the patella  The pain is worse with walking and relieved with rest   She denies mechanical symptoms such as locking and catching  She denies instability of the knee   Nubia Germain was taking Ibuprofen after initial injury, she then transitioned to Tylenol and has not taken anything today for pain  The following portions of the patient's history were reviewed and updated as appropriate: allergies, current medications, past family history, past medical history, past social history, past surgical history and problem list     Review of Systems   Constitutional: Negative for chills, fever and unexpected weight change  HENT: Negative for hearing loss, nosebleeds and sore throat  Eyes: Negative for pain, redness and visual disturbance  Respiratory: Negative for cough, shortness of breath and wheezing  Cardiovascular: Negative for chest pain, palpitations and leg swelling  Gastrointestinal: Negative for abdominal pain, nausea and vomiting  Endocrine: Negative for polydipsia and polyuria  Genitourinary: Negative for difficulty urinating and hematuria  Musculoskeletal: Positive for arthralgias  Negative for joint swelling and myalgias  Skin: Negative for rash and wound  Neurological: Negative for dizziness, numbness and headaches  Psychiatric/Behavioral: Negative for decreased concentration, dysphoric mood and suicidal ideas  The patient is not nervous/anxious  Objective:  /63 (BP Location: Right arm, Patient Position: Sitting, Cuff Size: Adult)   Pulse (!) 49   Wt 70 3 kg (155 lb)   BMI 25 02 kg/m²       Left Knee Exam     Tenderness   The patient is experiencing tenderness in the patella  Range of Motion   Left knee extension: lacking the last few degrees  Flexion: normal     Other   Erythema: absent  Scars: absent  Sensation: normal  Pulse: present  Swelling: mild  Effusion: no effusion present          Physical Exam  Vitals and nursing note reviewed  Constitutional:       Appearance: She is well-developed  HENT:      Head: Normocephalic and atraumatic  Eyes:      Pupils: Pupils are equal, round, and reactive to light  Pulmonary:      Effort: Pulmonary effort is normal       Breath sounds: Normal breath sounds     Musculoskeletal: Cervical back: Neck supple  Left knee: No effusion  Comments: As noted in HPI   Skin:     General: Skin is warm and dry  Neurological:      Mental Status: She is alert and oriented to person, place, and time  I have personally reviewed pertinent films in PACS and my interpretation is as follows  X-ray of the left knee obtained on 7/22/2021 and today 8/2/2021 demonstrate nondisplaced, comminuted patellar fracture         Fracture / Dislocation Treatment    Date/Time: 8/2/2021 12:17 PM  Performed by: Bladimir Richter MD  Authorized by: Bladimir Richter MD     Injury location:  Knee  Location details:  Left knee  Injury type:  Fracture  Fracture type: patellar    Manipulation performed?: No            Scribe Attestation    I,:  Jeannette Boyle am acting as a scribe while in the presence of the attending physician :       I,:  Bladimir Richter MD personally performed the services described in this documentation    as scribed in my presence :

## 2021-09-13 ENCOUNTER — APPOINTMENT (OUTPATIENT)
Dept: RADIOLOGY | Facility: OTHER | Age: 76
End: 2021-09-13
Payer: MEDICARE

## 2021-09-13 ENCOUNTER — OFFICE VISIT (OUTPATIENT)
Dept: OBGYN CLINIC | Facility: OTHER | Age: 76
End: 2021-09-13

## 2021-09-13 VITALS
DIASTOLIC BLOOD PRESSURE: 74 MMHG | WEIGHT: 155 LBS | BODY MASS INDEX: 25.02 KG/M2 | SYSTOLIC BLOOD PRESSURE: 126 MMHG | HEART RATE: 87 BPM

## 2021-09-13 DIAGNOSIS — S82.002A CLOSED NONDISPLACED FRACTURE OF LEFT PATELLA, UNSPECIFIED FRACTURE MORPHOLOGY, INITIAL ENCOUNTER: ICD-10-CM

## 2021-09-13 DIAGNOSIS — S82.002D CLOSED NONDISPLACED FRACTURE OF LEFT PATELLA WITH ROUTINE HEALING, UNSPECIFIED FRACTURE MORPHOLOGY, SUBSEQUENT ENCOUNTER: Primary | ICD-10-CM

## 2021-09-13 PROCEDURE — 73560 X-RAY EXAM OF KNEE 1 OR 2: CPT

## 2021-09-13 PROCEDURE — 99024 POSTOP FOLLOW-UP VISIT: CPT | Performed by: ORTHOPAEDIC SURGERY

## 2021-09-13 NOTE — PROGRESS NOTES
Assessment  Diagnoses and all orders for this visit:    Closed nondisplaced fracture of left patella with routine healing, unspecified fracture morphology, subsequent encounter        Discussion and Plan:    · Discontinue T-ROM  · WBAT  · Return to activities as tolerated  · Briefly discuss she does have underlying degenerative changes which she may continue to have pain from  Subjective:   Patient ID: Angie Tomlinson is a 68 y o  female      HPI  Patient presents today for follow up of a left patella fracture  Injury occured on 7/22/2021 she was doing yard work when she tripped over the rake falling onto the concrete  She was placed in a T-ROM at the last visit  She was provided with instructed on how to progress flexion in the brace  The following portions of the patient's history were reviewed and updated as appropriate: allergies, current medications, past family history, past medical history, past social history, past surgical history and problem list     Review of Systems   Constitutional: Negative for chills and fever  HENT: Negative for drooling and sneezing  Eyes: Negative for redness  Respiratory: Negative for cough and wheezing  Gastrointestinal: Negative for nausea and vomiting  Musculoskeletal:        Please see ortho exam   Psychiatric/Behavioral: Negative for behavioral problems  The patient is not nervous/anxious  Objective:  /74 (BP Location: Left arm, Patient Position: Sitting, Cuff Size: Adult)   Pulse 87   Wt 70 3 kg (155 lb)   BMI 25 02 kg/m²       Left Knee Exam     Tenderness   The patient is experiencing tenderness in the lateral joint line  Range of Motion   Left knee extension: 3  Flexion: 110     Other   Erythema: absent  Sensation: normal  Pulse: present  Swelling: none  Effusion: no effusion present            Physical Exam  Vitals reviewed  Constitutional:       Appearance: She is well-developed     Eyes:      Pupils: Pupils are equal, round, and reactive to light  Pulmonary:      Effort: Pulmonary effort is normal       Breath sounds: Normal breath sounds  Musculoskeletal:      Left knee: No effusion  Skin:     General: Skin is warm and dry  Neurological:      Mental Status: She is alert and oriented to person, place, and time  Psychiatric:         Behavior: Behavior normal          Thought Content: Thought content normal          Judgment: Judgment normal        I have personally reviewed pertinent films in PACS and my interpretation is as follows  Left knee x-rays demonstrates stable patella fracture, remain in acceptable alignment and position, tricompartmental degenerative changes       Scribe Attestation    I,:  Danica Epps am acting as a scribe while in the presence of the attending physician :       I,:  Horacio Wu MD personally performed the services described in this documentation    as scribed in my presence :

## 2021-10-02 ENCOUNTER — IMMUNIZATIONS (OUTPATIENT)
Dept: FAMILY MEDICINE CLINIC | Facility: HOSPITAL | Age: 76
End: 2021-10-02

## 2021-10-02 DIAGNOSIS — Z23 ENCOUNTER FOR IMMUNIZATION: Primary | ICD-10-CM

## 2021-10-02 PROCEDURE — 0001A SARS-COV-2 / COVID-19 MRNA VACCINE (PFIZER-BIONTECH) 30 MCG: CPT

## 2021-10-02 PROCEDURE — 91300 SARS-COV-2 / COVID-19 MRNA VACCINE (PFIZER-BIONTECH) 30 MCG: CPT

## 2021-11-05 ENCOUNTER — APPOINTMENT (OUTPATIENT)
Dept: LAB | Facility: HOSPITAL | Age: 76
End: 2021-11-05
Payer: MEDICARE

## 2021-11-05 DIAGNOSIS — E78.5 HYPERLIPIDEMIA, UNSPECIFIED HYPERLIPIDEMIA TYPE: ICD-10-CM

## 2021-11-05 LAB
CHOLEST SERPL-MCNC: 210 MG/DL (ref 50–200)
LDLC SERPL DIRECT ASSAY-MCNC: 106 MG/DL (ref 0–100)

## 2021-11-05 PROCEDURE — 83721 ASSAY OF BLOOD LIPOPROTEIN: CPT

## 2021-11-05 PROCEDURE — 82465 ASSAY BLD/SERUM CHOLESTEROL: CPT

## 2021-11-05 PROCEDURE — 36415 COLL VENOUS BLD VENIPUNCTURE: CPT

## 2021-11-08 ENCOUNTER — ANNUAL EXAM (OUTPATIENT)
Dept: OBGYN CLINIC | Facility: CLINIC | Age: 76
End: 2021-11-08
Payer: MEDICARE

## 2021-11-08 VITALS
BODY MASS INDEX: 26.63 KG/M2 | WEIGHT: 156 LBS | DIASTOLIC BLOOD PRESSURE: 72 MMHG | HEIGHT: 64 IN | SYSTOLIC BLOOD PRESSURE: 124 MMHG

## 2021-11-08 DIAGNOSIS — Z01.419 ENCOUNTER FOR ANNUAL ROUTINE GYNECOLOGICAL EXAMINATION: Primary | ICD-10-CM

## 2021-11-08 DIAGNOSIS — L82.1 SEBORRHEIC KERATOSES: ICD-10-CM

## 2021-11-08 DIAGNOSIS — N95.1 VAGINAL DRYNESS, MENOPAUSAL: ICD-10-CM

## 2021-11-08 DIAGNOSIS — Z12.31 ENCOUNTER FOR SCREENING MAMMOGRAM FOR MALIGNANT NEOPLASM OF BREAST: ICD-10-CM

## 2021-11-08 PROCEDURE — G0101 CA SCREEN;PELVIC/BREAST EXAM: HCPCS | Performed by: PHYSICIAN ASSISTANT

## 2021-11-11 ENCOUNTER — HOSPITAL ENCOUNTER (OUTPATIENT)
Dept: RADIOLOGY | Age: 76
Discharge: HOME | End: 2021-11-11
Payer: MEDICARE

## 2021-11-11 DIAGNOSIS — Z78.0 MENOPAUSE: ICD-10-CM

## 2021-11-11 DIAGNOSIS — S82.002A UNSPECIFIED FRACTURE OF LEFT PATELLA, INITIAL ENCOUNTER FOR CLOSED FRACTURE: ICD-10-CM

## 2021-11-11 PROCEDURE — 77080 DXA BONE DENSITY AXIAL: CPT

## 2022-03-03 ENCOUNTER — TELEPHONE (OUTPATIENT)
Dept: OBGYN CLINIC | Facility: CLINIC | Age: 77
End: 2022-03-03

## 2022-04-30 ENCOUNTER — IMMUNIZATIONS (OUTPATIENT)
Dept: FAMILY MEDICINE CLINIC | Facility: HOSPITAL | Age: 77
End: 2022-04-30

## 2022-04-30 PROCEDURE — 91305 COVID-19 PFIZER VACC TRIS-SUCROSE GRAY CAP 0.3 ML: CPT

## 2022-04-30 PROCEDURE — 0054A COVID-19 PFIZER VACC TRIS-SUCROSE GRAY CAP 0.3 ML: CPT

## 2022-10-28 ENCOUNTER — APPOINTMENT (OUTPATIENT)
Dept: LAB | Facility: CLINIC | Age: 77
End: 2022-10-28
Payer: MEDICARE

## 2022-10-28 DIAGNOSIS — E78.5 HYPERLIPIDEMIA, UNSPECIFIED HYPERLIPIDEMIA TYPE: ICD-10-CM

## 2022-10-28 LAB
ALBUMIN SERPL BCP-MCNC: 3.5 G/DL (ref 3.5–5)
ALP SERPL-CCNC: 70 U/L (ref 46–116)
ALT SERPL W P-5'-P-CCNC: 29 U/L (ref 12–78)
ANION GAP SERPL CALCULATED.3IONS-SCNC: 9 MMOL/L (ref 4–13)
AST SERPL W P-5'-P-CCNC: 15 U/L (ref 5–45)
BILIRUB SERPL-MCNC: 0.63 MG/DL (ref 0.2–1)
BUN SERPL-MCNC: 14 MG/DL (ref 5–25)
CALCIUM SERPL-MCNC: 9.6 MG/DL (ref 8.3–10.1)
CHLORIDE SERPL-SCNC: 107 MMOL/L (ref 96–108)
CHOLEST SERPL-MCNC: 222 MG/DL
CO2 SERPL-SCNC: 23 MMOL/L (ref 21–32)
CREAT SERPL-MCNC: 0.83 MG/DL (ref 0.6–1.3)
GFR SERPL CREATININE-BSD FRML MDRD: 68 ML/MIN/1.73SQ M
GLUCOSE P FAST SERPL-MCNC: 103 MG/DL (ref 65–99)
HDLC SERPL-MCNC: 78 MG/DL
LDLC SERPL CALC-MCNC: 117 MG/DL (ref 0–100)
NONHDLC SERPL-MCNC: 144 MG/DL
POTASSIUM SERPL-SCNC: 4.2 MMOL/L (ref 3.5–5.3)
PROT SERPL-MCNC: 7.7 G/DL (ref 6.4–8.4)
SODIUM SERPL-SCNC: 139 MMOL/L (ref 135–147)
TRIGL SERPL-MCNC: 133 MG/DL

## 2022-10-28 PROCEDURE — 36415 COLL VENOUS BLD VENIPUNCTURE: CPT

## 2022-10-28 PROCEDURE — 80061 LIPID PANEL: CPT

## 2022-10-28 PROCEDURE — 80053 COMPREHEN METABOLIC PANEL: CPT

## 2022-11-09 ENCOUNTER — ANNUAL EXAM (OUTPATIENT)
Dept: OBGYN CLINIC | Facility: CLINIC | Age: 77
End: 2022-11-09

## 2022-11-09 VITALS
HEIGHT: 63 IN | WEIGHT: 154.4 LBS | DIASTOLIC BLOOD PRESSURE: 80 MMHG | BODY MASS INDEX: 27.36 KG/M2 | SYSTOLIC BLOOD PRESSURE: 164 MMHG

## 2022-11-09 DIAGNOSIS — Z01.419 ROUTINE GYNECOLOGICAL EXAMINATION: ICD-10-CM

## 2022-11-09 DIAGNOSIS — Z13.820 SCREENING FOR OSTEOPOROSIS: ICD-10-CM

## 2022-11-09 DIAGNOSIS — Z78.0 POSTMENOPAUSAL STATUS: ICD-10-CM

## 2022-11-09 DIAGNOSIS — M85.80 OSTEOPENIA, UNSPECIFIED LOCATION: Primary | ICD-10-CM

## 2022-11-09 DIAGNOSIS — R03.0 ELEVATED BLOOD PRESSURE READING IN OFFICE WITHOUT DIAGNOSIS OF HYPERTENSION: ICD-10-CM

## 2022-11-09 NOTE — PROGRESS NOTES
Assessment   68 y o  postmenopausal female presenting for annual exam      Plan   Diagnoses and all orders for this visit:    Osteopenia, unspecified location  -     DXA bone density spine hip and pelvis; Future    Vitamin D and Calcium supplement encouraged  To increase intake of calcium in diet as well  Reviewed importance of weight bearing exercise  Will plan to monitor DEXA per recommendations     Elevated blood pressure reading in office without diagnosis of hypertension    /80 on recheck today  No hx of HTN and not symptomatic of elevation  Has annual exam with her PCP this Friday and will plan to have rechecked at that time  Reviewed sx to report / seek care with  Agreeable  Routine gynecological examination  Normal findings on routine exam    considerations reviewed  Aware of sx to report  Breast awareness/SBE encouraged  Encouraged 150 min of exercise per week  Reviewed balanced diet  Vitamin D and Calcium supplement recommended  Postmenopausal status  -     DXA bone density spine hip and pelvis; Future    Screening for osteoporosis  -     DXA bone density spine hip and pelvis; Future         Pap, mammogram, and colonoscopy no longer indicated  She is agreeable to this, declining these screenings  Reviewed sx to report  DEXA due 2023 - slip given       RTO one year for yearly exam or sooner as needed  __________________________________________________________________      Subjective     Saintclair Rise is a 68 y o  postmenopausal female presenting for annual exam  She is without complaint and does not want STD testing today  Erica Madrid is active, walking almost daily with her sister in law   She has     SCREENING  Last Pap: 10/19/2018  NILM  Last Mammo: 07/08/2020 BIRADS 2 - Benign  Last Colonoscopy: 12/01/2017   Last DEXA:  11/11/2021 osteopenia    GYN  Denies postmenopausal vaginal bleeding, dryness, vaginal discharge, itching, odor, pelvic pain and vulvar/vaginal symptoms    Sexually active: occasionally with her   Manages dryness with lubricant and estrogen cream not covered by insurance  ASCCP guidelines reviewed and this low risk patient was advised she meets criteria to d/c pap screening given >65 with recent negatives  OB  [id number]        Complaints: denies  Denies leakage/difficulty urinating, dysuria, hematuria, and urinary frequency/urgency      BREAST  Complaints: denies  Denies: breast lump, breast tenderness, dryness, nipple discharge, pruritus, skin color change, and skin lesion(s)  Personal hx: none     GENERAL  PMH reviewed/updated and is as below  Patient does follow with a PCP  Pertinent Family Hx:   Family hx of breast cancer: no  Family hx of ovarian cancer: no  Family hx of colon cancer: no      Past Medical History:   Diagnosis Date   • Arthritis     "hands"   • Foot pain, right    • GERD (gastroesophageal reflux disease)    • Hyperlipidemia    • Osteopenia     "borderline"   • Positional vertigo     not recently   • Wears glasses    • Wears partial dentures        Past Surgical History:   Procedure Laterality Date   • BREAST LUMPECTOMY     • CHOLECYSTECTOMY     • FOOT SURGERY      left foot big toe implant   • KNEE SURGERY     • MD COLONOSCOPY FLX DX W/COLLJ SPEC WHEN PFRMD N/A 12/1/2017    Procedure: COLONOSCOPY;  Surgeon: Augustin Land MD;  Location: [facility];   Service: Colorectal   • MD OPEN TX METATARSOPHALANGEAL JOINT DISLOCATION Right 3/22/2019    Procedure: 2ND MPJ SUBLAXATION REPAIR;  Surgeon: Susanna Gatica DPM;  Location: [facility];  Service: Podiatry   • MD REPAIR OF Raynelle Prophet Right 3/22/2019    Procedure: HAMMERTOE CORRECTION;  Surgeon: Susanna Gatica DPM;  Location: [facility];  Service: Podiatry         Current Outpatient Medications:   •  Ascorbic Acid (VITAMIN C PO), Take by mouth, Disp: , Rfl:   •  Calcium Citrate (CITRACAL PO), Take by mouth daily, Disp: , Rfl:   •  cholecalciferol (VITAMIN D3) 1,000 units tablet, Take 1,000 Units by mouth daily, Disp: , Rfl:   •  ibuprofen (MOTRIN) 600 mg tablet, TAKE 1 TABLET BY MOUTH EVERY 8 HOURS AS NEEDED PAIN, Disp: , Rfl:   •  multivitamin-iron-minerals-folic acid (CENTRUM) chewable tablet, Chew 1 tablet daily, Disp: , Rfl:   •  pravastatin (PRAVACHOL) 10 mg tablet, Take 10 mg by mouth daily, Disp: , Rfl:   •  simvastatin (ZOCOR) 10 mg tablet, Take 10 mg by mouth daily at bedtime  (Patient not taking: Reported on 11/8/2021 ), Disp: , Rfl:     Allergies   Allergen Reactions   • Penicillins Anaphylaxis   • Benadryl [Diphenhydramine]      Cant remember reaction       Social History     Socioeconomic History   • Marital status: /Civil Union     Spouse name: Not on file   • Number of children: Not on file   • Years of education: Not on file   • Highest education level: Not on file   Occupational History   • Not on file   Tobacco Use   • Smoking status: Never Smoker   • Smokeless tobacco: Never Used   Vaping Use   • Vaping Use: Never used   Substance and Sexual Activity   • Alcohol use: Yes     Alcohol/week: 1 0 standard drink     Types: 1 Glasses of wine per week     Comment: socially   • Drug use: No   • Sexual activity: Yes     Partners: Male     Birth control/protection: Post-menopausal   Other Topics Concern   • Not on file   Social History Narrative   • Not on file     Social Determinants of Health     Financial Resource Strain: Not on file   Food Insecurity: Not on file   Transportation Needs: Not on file   Physical Activity: Not on file   Stress: Not on file   Social Connections: Not on file   Intimate Partner Violence: Not on file   Housing Stability: Not on file         Review of Systems     ROS:  Constitutional: Negative for appetite change, fatigue and unexpected weight change  Respiratory: Negative  Cardiovascular: Negative  Gastrointestinal: Negative for abdominal pain and change in bowel habits/constipation/diarrhea     Breasts:  Negative other than as noted above  Genitourinary: Negative other than as noted above  Psychiatric: Negative for mood difficulties  Objective      Ht 5' 3 25" (1 607 m)   Wt 70 kg (154 lb 6 4 oz)   BMI 27 13 kg/m²     Physical Examination:  Patient appears well and is not in distress  Neck is supple without masses  Breasts are symmetrical without mass, tenderness, nipple discharge, skin changes or adenopathy  Abdomen is soft and nontender without masses  External genitals are normal without lesions or rashes  Urethral meatus and urethra are normal  Bladder - mild cystocele  Vagina is without discharge or bleeding  Cervix is only partially visualized without lesion or discharge  Uterus is normal, mobile, nontender without palpable mass  Adnexa are normal, nontender, without palpable mass

## 2023-01-30 ENCOUNTER — HOSPITAL ENCOUNTER (OUTPATIENT)
Dept: RADIOLOGY | Facility: HOSPITAL | Age: 78
Discharge: HOME | End: 2023-01-30

## 2023-01-30 DIAGNOSIS — M79.18 LEFT BUTTOCK PAIN: ICD-10-CM

## 2023-11-08 ENCOUNTER — APPOINTMENT (OUTPATIENT)
Dept: LAB | Facility: CLINIC | Age: 78
End: 2023-11-08
Payer: MEDICARE

## 2023-11-08 DIAGNOSIS — R53.83 FATIGUE, UNSPECIFIED TYPE: ICD-10-CM

## 2023-11-08 DIAGNOSIS — E78.5 HYPERLIPIDEMIA, UNSPECIFIED HYPERLIPIDEMIA TYPE: ICD-10-CM

## 2023-11-08 DIAGNOSIS — E55.9 VITAMIN D INSUFFICIENCY: ICD-10-CM

## 2023-11-08 DIAGNOSIS — M19.90 SENILE ARTHRITIS: ICD-10-CM

## 2023-11-08 LAB
25(OH)D3 SERPL-MCNC: 93.2 NG/ML (ref 30–100)
ALBUMIN SERPL BCP-MCNC: 4.1 G/DL (ref 3.5–5)
ALP SERPL-CCNC: 55 U/L (ref 34–104)
ALT SERPL W P-5'-P-CCNC: 14 U/L (ref 7–52)
ANION GAP SERPL CALCULATED.3IONS-SCNC: 6 MMOL/L
AST SERPL W P-5'-P-CCNC: 15 U/L (ref 13–39)
BASOPHILS # BLD AUTO: 0.03 THOUSANDS/ÂΜL (ref 0–0.1)
BASOPHILS NFR BLD AUTO: 1 % (ref 0–1)
BILIRUB SERPL-MCNC: 0.59 MG/DL (ref 0.2–1)
BUN SERPL-MCNC: 14 MG/DL (ref 5–25)
CALCIUM SERPL-MCNC: 9.1 MG/DL (ref 8.4–10.2)
CHLORIDE SERPL-SCNC: 107 MMOL/L (ref 96–108)
CHOLEST SERPL-MCNC: 207 MG/DL
CO2 SERPL-SCNC: 28 MMOL/L (ref 21–32)
CREAT SERPL-MCNC: 0.82 MG/DL (ref 0.6–1.3)
EOSINOPHIL # BLD AUTO: 0.13 THOUSAND/ÂΜL (ref 0–0.61)
EOSINOPHIL NFR BLD AUTO: 3 % (ref 0–6)
ERYTHROCYTE [DISTWIDTH] IN BLOOD BY AUTOMATED COUNT: 13 % (ref 11.6–15.1)
GFR SERPL CREATININE-BSD FRML MDRD: 68 ML/MIN/1.73SQ M
GLUCOSE P FAST SERPL-MCNC: 102 MG/DL (ref 65–99)
HCT VFR BLD AUTO: 40.3 % (ref 34.8–46.1)
HDLC SERPL-MCNC: 67 MG/DL
HGB BLD-MCNC: 13.1 G/DL (ref 11.5–15.4)
IMM GRANULOCYTES # BLD AUTO: 0.02 THOUSAND/UL (ref 0–0.2)
IMM GRANULOCYTES NFR BLD AUTO: 1 % (ref 0–2)
LDLC SERPL CALC-MCNC: 115 MG/DL (ref 0–100)
LYMPHOCYTES # BLD AUTO: 0.96 THOUSANDS/ÂΜL (ref 0.6–4.47)
LYMPHOCYTES NFR BLD AUTO: 23 % (ref 14–44)
MCH RBC QN AUTO: 29.2 PG (ref 26.8–34.3)
MCHC RBC AUTO-ENTMCNC: 32.5 G/DL (ref 31.4–37.4)
MCV RBC AUTO: 90 FL (ref 82–98)
MONOCYTES # BLD AUTO: 0.5 THOUSAND/ÂΜL (ref 0.17–1.22)
MONOCYTES NFR BLD AUTO: 12 % (ref 4–12)
NEUTROPHILS # BLD AUTO: 2.53 THOUSANDS/ÂΜL (ref 1.85–7.62)
NEUTS SEG NFR BLD AUTO: 60 % (ref 43–75)
NRBC BLD AUTO-RTO: 0 /100 WBCS
PLATELET # BLD AUTO: 227 THOUSANDS/UL (ref 149–390)
PMV BLD AUTO: 10.7 FL (ref 8.9–12.7)
POTASSIUM SERPL-SCNC: 3.7 MMOL/L (ref 3.5–5.3)
PROT SERPL-MCNC: 7.1 G/DL (ref 6.4–8.4)
RBC # BLD AUTO: 4.49 MILLION/UL (ref 3.81–5.12)
SODIUM SERPL-SCNC: 141 MMOL/L (ref 135–147)
TRIGL SERPL-MCNC: 123 MG/DL
WBC # BLD AUTO: 4.17 THOUSAND/UL (ref 4.31–10.16)

## 2023-11-08 PROCEDURE — 85025 COMPLETE CBC W/AUTO DIFF WBC: CPT

## 2023-11-08 PROCEDURE — 80053 COMPREHEN METABOLIC PANEL: CPT

## 2023-11-08 PROCEDURE — 36415 COLL VENOUS BLD VENIPUNCTURE: CPT

## 2023-11-08 PROCEDURE — 80061 LIPID PANEL: CPT

## 2023-11-08 PROCEDURE — 82306 VITAMIN D 25 HYDROXY: CPT

## 2024-01-24 ENCOUNTER — HOSPITAL ENCOUNTER (OUTPATIENT)
Dept: RADIOLOGY | Age: 79
Discharge: HOME | End: 2024-01-24
Payer: MEDICARE

## 2024-01-24 DIAGNOSIS — Z13.820 SCREENING FOR OSTEOPOROSIS: ICD-10-CM

## 2024-01-24 DIAGNOSIS — Z78.0 POSTMENOPAUSAL STATUS: ICD-10-CM

## 2024-01-24 DIAGNOSIS — M85.80 OSTEOPENIA, UNSPECIFIED LOCATION: ICD-10-CM

## 2024-01-24 PROCEDURE — 77080 DXA BONE DENSITY AXIAL: CPT

## 2024-07-29 ENCOUNTER — HOSPITAL ENCOUNTER (OUTPATIENT)
Dept: RADIOLOGY | Age: 79
Discharge: HOME | End: 2024-07-29
Payer: MEDICARE

## 2024-07-29 VITALS — WEIGHT: 154 LBS | HEIGHT: 63 IN | BODY MASS INDEX: 27.29 KG/M2

## 2024-07-29 DIAGNOSIS — Z12.31 SCREENING MAMMOGRAM FOR BREAST CANCER: ICD-10-CM

## 2024-07-29 PROCEDURE — 77063 BREAST TOMOSYNTHESIS BI: CPT

## 2024-07-29 PROCEDURE — 77067 SCR MAMMO BI INCL CAD: CPT

## 2024-11-15 ENCOUNTER — APPOINTMENT (OUTPATIENT)
Dept: LAB | Facility: CLINIC | Age: 79
End: 2024-11-15
Payer: MEDICARE

## 2024-11-15 DIAGNOSIS — E78.5 HYPERLIPIDEMIA, UNSPECIFIED HYPERLIPIDEMIA TYPE: ICD-10-CM

## 2024-11-15 DIAGNOSIS — R73.09 ELEVATED GLUCOSE: ICD-10-CM

## 2024-11-15 DIAGNOSIS — R53.83 FATIGUE, UNSPECIFIED TYPE: ICD-10-CM

## 2024-11-15 LAB
ALBUMIN SERPL BCG-MCNC: 4.3 G/DL (ref 3.5–5)
ALP SERPL-CCNC: 55 U/L (ref 34–104)
ALT SERPL W P-5'-P-CCNC: 17 U/L (ref 7–52)
ANION GAP SERPL CALCULATED.3IONS-SCNC: 8 MMOL/L (ref 4–13)
AST SERPL W P-5'-P-CCNC: 17 U/L (ref 13–39)
BASOPHILS # BLD AUTO: 0.02 THOUSANDS/ÂΜL (ref 0–0.1)
BASOPHILS NFR BLD AUTO: 0 % (ref 0–1)
BILIRUB SERPL-MCNC: 0.68 MG/DL (ref 0.2–1)
BUN SERPL-MCNC: 19 MG/DL (ref 5–25)
CALCIUM SERPL-MCNC: 9.3 MG/DL (ref 8.4–10.2)
CHLORIDE SERPL-SCNC: 103 MMOL/L (ref 96–108)
CHOLEST SERPL-MCNC: 199 MG/DL (ref ?–200)
CO2 SERPL-SCNC: 28 MMOL/L (ref 21–32)
CREAT SERPL-MCNC: 0.85 MG/DL (ref 0.6–1.3)
EOSINOPHIL # BLD AUTO: 0.16 THOUSAND/ÂΜL (ref 0–0.61)
EOSINOPHIL NFR BLD AUTO: 4 % (ref 0–6)
ERYTHROCYTE [DISTWIDTH] IN BLOOD BY AUTOMATED COUNT: 13.2 % (ref 11.6–15.1)
EST. AVERAGE GLUCOSE BLD GHB EST-MCNC: 114 MG/DL
GFR SERPL CREATININE-BSD FRML MDRD: 65 ML/MIN/1.73SQ M
GLUCOSE P FAST SERPL-MCNC: 96 MG/DL (ref 65–99)
HBA1C MFR BLD: 5.6 %
HCT VFR BLD AUTO: 42.1 % (ref 34.8–46.1)
HDLC SERPL-MCNC: 71 MG/DL
HGB BLD-MCNC: 13.5 G/DL (ref 11.5–15.4)
IMM GRANULOCYTES # BLD AUTO: 0.01 THOUSAND/UL (ref 0–0.2)
IMM GRANULOCYTES NFR BLD AUTO: 0 % (ref 0–2)
LDLC SERPL CALC-MCNC: 103 MG/DL (ref 0–100)
LDLC SERPL DIRECT ASSAY-MCNC: 110 MG/DL (ref 0–100)
LYMPHOCYTES # BLD AUTO: 0.82 THOUSANDS/ÂΜL (ref 0.6–4.47)
LYMPHOCYTES NFR BLD AUTO: 18 % (ref 14–44)
MCH RBC QN AUTO: 29.4 PG (ref 26.8–34.3)
MCHC RBC AUTO-ENTMCNC: 32.1 G/DL (ref 31.4–37.4)
MCV RBC AUTO: 92 FL (ref 82–98)
MONOCYTES # BLD AUTO: 0.43 THOUSAND/ÂΜL (ref 0.17–1.22)
MONOCYTES NFR BLD AUTO: 10 % (ref 4–12)
NEUTROPHILS # BLD AUTO: 3.1 THOUSANDS/ÂΜL (ref 1.85–7.62)
NEUTS SEG NFR BLD AUTO: 68 % (ref 43–75)
NONHDLC SERPL-MCNC: 128 MG/DL
NRBC BLD AUTO-RTO: 0 /100 WBCS
PLATELET # BLD AUTO: 237 THOUSANDS/UL (ref 149–390)
PMV BLD AUTO: 10.6 FL (ref 8.9–12.7)
POTASSIUM SERPL-SCNC: 3.9 MMOL/L (ref 3.5–5.3)
PROT SERPL-MCNC: 7.2 G/DL (ref 6.4–8.4)
RBC # BLD AUTO: 4.59 MILLION/UL (ref 3.81–5.12)
SODIUM SERPL-SCNC: 139 MMOL/L (ref 135–147)
TRIGL SERPL-MCNC: 127 MG/DL (ref ?–150)
WBC # BLD AUTO: 4.54 THOUSAND/UL (ref 4.31–10.16)

## 2024-11-15 PROCEDURE — 80053 COMPREHEN METABOLIC PANEL: CPT

## 2024-11-15 PROCEDURE — 36415 COLL VENOUS BLD VENIPUNCTURE: CPT

## 2024-11-15 PROCEDURE — 85025 COMPLETE CBC W/AUTO DIFF WBC: CPT

## 2024-11-15 PROCEDURE — 83721 ASSAY OF BLOOD LIPOPROTEIN: CPT

## 2024-11-15 PROCEDURE — 80061 LIPID PANEL: CPT

## 2025-03-31 ENCOUNTER — ANNUAL EXAM (OUTPATIENT)
Dept: OBGYN CLINIC | Facility: CLINIC | Age: 80
End: 2025-03-31
Payer: MEDICARE

## 2025-03-31 VITALS
SYSTOLIC BLOOD PRESSURE: 130 MMHG | HEIGHT: 64 IN | WEIGHT: 153.4 LBS | BODY MASS INDEX: 26.19 KG/M2 | DIASTOLIC BLOOD PRESSURE: 82 MMHG

## 2025-03-31 DIAGNOSIS — Z01.419 ENCOUNTER FOR ANNUAL ROUTINE GYNECOLOGICAL EXAMINATION: Primary | ICD-10-CM

## 2025-03-31 DIAGNOSIS — M85.80 OSTEOPENIA, UNSPECIFIED LOCATION: ICD-10-CM

## 2025-03-31 DIAGNOSIS — N39.41 URGE INCONTINENCE: ICD-10-CM

## 2025-03-31 DIAGNOSIS — N81.11 CYSTOCELE, MIDLINE: ICD-10-CM

## 2025-03-31 DIAGNOSIS — Z12.31 ENCOUNTER FOR SCREENING MAMMOGRAM FOR MALIGNANT NEOPLASM OF BREAST: ICD-10-CM

## 2025-03-31 PROCEDURE — G0101 CA SCREEN;PELVIC/BREAST EXAM: HCPCS | Performed by: PHYSICIAN ASSISTANT

## 2025-03-31 NOTE — PROGRESS NOTES
Name: Marsha Saucedo      : 1945      MRN: [id number]  Encounter Provider: Cynthia Murillo PA-C  Encounter Date: 3/31/2025   Encounter department: [facility]  :  Assessment & Plan  Encounter for annual routine gynecological examination  Cervical cancer screening:  Normal cervical exam. Pap no longer indicated.    Breast cancer screening:  Normal breast exam. Reviewed breast self-awareness and screening recommendations. Mammo ordered.   Colon cancer screening:  Up to date - no longer indicated  DEXA screening: Last DEXA 2024 showed osteopenia. Managed by PCP  Depression Screening: Patient's depression screening was assessed with a PHQ-2 score of 0.   BMI Counseling: Body mass index is 26.75 kg/m². Discussed diet and exercise recommendations.  Return to office 2 year for annual exam, sooner PRN / otherwise directed .         Encounter for screening mammogram for malignant neoplasm of breast    Orders:    Mammo screening bilateral w 3d and cad; Future    Cystocele, midline  Exam C/W cystocele. Reviewed risk factors for same. Discussed options for management. Patient is interested in PT referral at this time and referral placed    Orders:    Ambulatory referral to Physical Therapy; Future    Urge incontinence  See above.  Orders:    Ambulatory referral to Physical Therapy; Future    Osteopenia, unspecified location  Is taking a Vitamin D and Calcium supplement.   To increase intake of calcium in diet as well.   Reviewed importance of weight bearing exercise.   Will plan to monitor DEXA per recommendations              History of Present Illness   Marsha Saucedo is a 79 y.o. postmenopausal female presenting for annual exam.     Doing well.  is showing some signs of cognitive decline. He has family hx of Alzheimer's and is working with neurology for tx.   She is active with friends - going to eat with them or visiting most days. Walks with her HANNA  daily for exercise and talks with her sister in ?[place] regularly.     She is without complaint and denies  bleeding.     Last Pap: 2018 NILM  Last Mammo: 07/29/2024 BIRADS 2 - Benign  Last Colonoscopy: 12/01/2017 - no longer indicated    Last DEXA: 1/2024 osteopenia     Sexually active: No    No breast concerns.   Rare UUI - does not find bothersome. Declines intervention.     ASCCP guidelines reviewed and this low risk patient was advised she meets criteria to d/c pap screening given age and recent negatives.     Pertinent Family Hx:   Family hx of breast cancer: no  Family hx of ovarian cancer: no  Family hx of colon cancer: no      Review of Systems   Constitutional: Negative.         No breast concerns.   Gastrointestinal:  Negative for abdominal pain.        + fecal urgency. Not bothersome. Following with GI for this. No change in bowel habits.   Genitourinary:  Negative for difficulty urinating, dysuria, frequency, pelvic pain, vaginal bleeding and vaginal discharge.   Skin: Negative.    Neurological:  Negative for headaches.   Psychiatric/Behavioral: Negative.       Medical History Reviewed by provider this encounter:  Tobacco  Allergies  Meds  Problems  Med Hx  Surg Hx  Fam Hx     .  Current Outpatient Medications on File Prior to Visit   Medication Sig Dispense Refill    Ascorbic Acid (VITAMIN C PO) Take by mouth      Calcium Citrate (CITRACAL PO) Take by mouth daily      cholecalciferol (VITAMIN D3) 1,000 units tablet Take 1,000 Units by mouth daily      ibuprofen (MOTRIN) 600 mg tablet TAKE 1 TABLET BY MOUTH EVERY 8 HOURS AS NEEDED PAIN      multivitamin-iron-minerals-folic acid (CENTRUM) chewable tablet Chew 1 tablet daily      pravastatin (PRAVACHOL) 10 mg tablet Take 10 mg by mouth daily      simvastatin (ZOCOR) 10 mg tablet Take 10 mg by mouth daily at bedtime. (Patient not taking: Reported on 11/8/2021 )       No current facility-administered medications on file prior to visit.     "  Social History     Tobacco Use    Smoking status: Never    Smokeless tobacco: Never   Vaping Use    Vaping status: Never Used   Substance and Sexual Activity    Alcohol use: Yes     Alcohol/week: 1.0 standard drink of alcohol     Types: 1 Glasses of wine per week     Comment: socially    Drug use: No    Sexual activity: Not Currently     Partners: Male     Birth control/protection: Post-menopausal        Objective   /82 (BP Location: Left arm, Patient Position: Sitting, Cuff Size: Standard)   Ht 5' 3.5\" (1.613 m)   Wt 69.6 kg (153 lb 6.4 oz)   BMI 26.75 kg/m²      Physical Exam  Vitals and nursing note reviewed.   Constitutional:       General: She is not in acute distress.     Appearance: Normal appearance.   HENT:      Head: Normocephalic and atraumatic.   Eyes:      Conjunctiva/sclera: Conjunctivae normal.   Pulmonary:      Effort: Pulmonary effort is normal.   Chest:   Breasts:     Breasts are symmetrical.      Right: Normal.      Left: Normal.   Abdominal:      General: There is no distension.      Palpations: Abdomen is soft.      Tenderness: There is no abdominal tenderness.   Genitourinary:     General: Normal vulva.      Pubic Area: No rash.       Labia:         Right: No rash or lesion.         Left: No rash or lesion.       Vagina: Normal.      Cervix: Normal.      Uterus: Normal.       Adnexa: Right adnexa normal and left adnexa normal.   Lymphadenopathy:      Upper Body:      Right upper body: No supraclavicular or axillary adenopathy.      Left upper body: No supraclavicular or axillary adenopathy.      Lower Body: No right inguinal adenopathy. No left inguinal adenopathy.   Skin:     General: Skin is warm and dry.   Neurological:      General: No focal deficit present.      Mental Status: She is alert.      Cranial Nerves: No cranial nerve deficit.   Psychiatric:         Mood and Affect: Mood normal.         Behavior: Behavior normal.         "

## 2025-03-31 NOTE — ASSESSMENT & PLAN NOTE
Is taking a Vitamin D and Calcium supplement.   To increase intake of calcium in diet as well.   Reviewed importance of weight bearing exercise.   Will plan to monitor DEXA per recommendations

## 2025-05-30 ENCOUNTER — NURSE TRIAGE (OUTPATIENT)
Age: 80
End: 2025-05-30

## 2025-05-30 NOTE — TELEPHONE ENCOUNTER
"REASON FOR CONVERSATION: Urinary Symptoms    SYMPTOMS: frequent urination, pelvic pressure and pubic pain    OTHER HEALTH INFORMATION: Patient is postmenopausal. Complains of urinary symptoms (frequency) starting this afternoon. Denies pain, hematuria, fevers, flank pain. Advised can order outpatient UA and culture, however, likely wouldn't be able to treat until next week. Advised patient she should go to urgent care if treatment desired. Patient agreeable to urgent care.     PROTOCOL DISPOSITION: Go to Urgent Care Now (overriding See Today in Office)    CARE ADVICE PROVIDED: increase hydration, decrease bladder irritants    PRACTICE FOLLOW-UP: n/a      Reason for Disposition   Urinating more frequently than usual (i.e., frequency) OR new-onset of the feeling of an urgent need to urinate (i.e., urgency)    Answer Assessment - Initial Assessment Questions  1. SYMPTOM: \"What's the main symptom you're concerned about?\" (e.g., frequency, incontinence)      Urinary pressure, and pain in pubic area, frequent urine  2. ONSET: \"When did the  symptoms  start?\"      A few days  3. PAIN: \"Is there any pain?\" If Yes, ask: \"How bad is it?\" (Scale: 1-10; mild, moderate, severe)      mild  4. CAUSE: \"What do you think is causing the symptoms?\"      UTI  5. OTHER SYMPTOMS: \"Do you have any other symptoms?\" (e.g., blood in urine, fever, flank pain, pain with urination)      denies  6. PREGNANCY: \"Is there any chance you are pregnant?\" \"When was your last menstrual period?\"      Postmenopausal    Protocols used: Urinary Symptoms-Adult-[place]    "

## 2025-07-11 ENCOUNTER — APPOINTMENT (OUTPATIENT)
Dept: RADIOLOGY | Age: 80
End: 2025-07-11
Attending: STUDENT IN AN ORGANIZED HEALTH CARE EDUCATION/TRAINING PROGRAM
Payer: COMMERCIAL

## 2025-07-11 ENCOUNTER — OFFICE VISIT (OUTPATIENT)
Dept: OBGYN CLINIC | Facility: CLINIC | Age: 80
End: 2025-07-11
Payer: COMMERCIAL

## 2025-07-11 VITALS — HEIGHT: 64 IN | WEIGHT: 153 LBS | BODY MASS INDEX: 26.12 KG/M2

## 2025-07-11 DIAGNOSIS — M17.0 BILATERAL PRIMARY OSTEOARTHRITIS OF KNEE: Primary | ICD-10-CM

## 2025-07-11 DIAGNOSIS — M25.561 RIGHT KNEE PAIN, UNSPECIFIED CHRONICITY: ICD-10-CM

## 2025-07-11 PROCEDURE — 73564 X-RAY EXAM KNEE 4 OR MORE: CPT

## 2025-07-11 PROCEDURE — 99204 OFFICE O/P NEW MOD 45 MIN: CPT | Performed by: STUDENT IN AN ORGANIZED HEALTH CARE EDUCATION/TRAINING PROGRAM

## 2025-07-11 PROCEDURE — 20610 DRAIN/INJ JOINT/BURSA W/O US: CPT

## 2025-07-11 PROCEDURE — 73562 X-RAY EXAM OF KNEE 3: CPT

## 2025-07-11 RX ADMIN — BETAMETHASONE SODIUM PHOSPHATE AND BETAMETHASONE ACETATE 12 MG: 3; 3 INJECTION, SUSPENSION INTRA-ARTICULAR; INTRALESIONAL; INTRAMUSCULAR; SOFT TISSUE at 09:30

## 2025-07-11 RX ADMIN — LIDOCAINE HYDROCHLORIDE 2 ML: 10 INJECTION, SOLUTION INFILTRATION; PERINEURAL at 09:30

## 2025-07-11 NOTE — PROGRESS NOTES
Date: 25  Marsha Saucedo   MRN# [id number]  : 1945      Chief Complaint: Right Knee Pain    The patient verbalized understanding of exam findings and treatment plan. We engaged in the shared decision-making process and treatment options were discussed at length with the patient. Surgical and conservative management discussed today along with risks and benefits. Patient was agreeable with the plan and all questions were answered to satisfaction.     Assessment & Plan  Bilateral primary osteoarthritis of knee  Patient has bilateral severe knee OA. She only describes pain to the right knee.   -WBAT  -Activity modification to limit strain or impact on the joint  -NSAID as needed  -Tylenol 1000mg up to three times daily as needed. Do not exceed 3000mg daily  -Supervised physical therapy. Script provided   -Home exercise program directed by PT  -Knee sleeve or brace for comfort  -Cane or walker recommended to offload joint  -Corticosteroid injection was offered, accepted, and administered.  Risk benefits and alternatives were discussed prior to injection.  Patient tolerated the procedure well.  -Patient may follow up prn for further evaluation and treatment         Subjective:     Knee Pain  Patient presents to the clinic today, self referred, , with complaints of right knee pain. Patient states worsening right knee pain despite no injury or trauma. This is evaluated as a personal injury. The pain began 1 week ago. The pain is located medial and rates their pain as 7/10. She describes the symptoms as aching and sharp. Symptoms improve with rest. The symptoms are worse with activity. The knee has given out or felt unstable. The patient can bend and straighten the knee fully. No other orthopedic complaints or concerns.     Prior treatment:  NSAIDs Yes    Bracing Yes   Physical Therapy No   Cortisone Injections No   Viscosupplementation No     External Records Reviewed:   none    Orthopedic  "PMH  None    Orthopedic Surgical History  None    Estimated body mass index is 26.68 kg/m² as calculated from the following:    Height as of this encounter: 5' 3.5\" (1.613 m).    Weight as of this encounter: 69.4 kg (153 lb).    Lab Results   Component Value Date    HGBA1C 5.6 11/15/2024    HGBA1C 5.4 06/30/2014   .   Lab Results   Component Value Date    EGFR 65 11/15/2024    EGFR 68 11/08/2023    EGFR 68 10/28/2022    CREATININE 0.85 11/15/2024    CREATININE 0.82 11/08/2023    CREATININE 0.83 10/28/2022        Allergy:  Allergies[1]  Medications:  All current active meds have been reviewed   Past Medical History:  Past Medical History[2]  Past Surgical History:  Past Surgical History[3]  Family History:  Family History[4]  Social History:  Social History     Substance and Sexual Activity   Alcohol Use Yes    Alcohol/week: 1.0 standard drink of alcohol    Types: 1 Glasses of wine per week    Comment: socially     Social History     Substance and Sexual Activity   Drug Use No     Tobacco Use History[5]        Review of Systems:  General- denies fever/chills  HEENT- denies hearing loss or sore throat  Eyes- denies eye pain or visual disturbances, denies red eyes  Respiratory- denies cough or SOB  Cardio- denies chest pain or palpitations  GI- denies abdominal pain  Endocrine- denies urinary frequency  Urinary- denies pain with urination  Musculoskeletal- Negative except noted above  Skin- denies rashes or wounds  Neurological- denies dizziness or headache  Psychiatric- denies anxiety or difficulty concentrating      Objective:   BP Readings from Last 1 Encounters:   03/31/25 130/82      Wt Readings from Last 1 Encounters:   07/11/25 69.4 kg (153 lb)      Pulse Readings from Last 1 Encounters:   09/13/21 87        BMI: Estimated body mass index is 26.68 kg/m² as calculated from the following:    Height as of this encounter: 5' 3.5\" (1.613 m).    Weight as of this encounter: 69.4 kg (153 lb).      Physical Exam  Ht 5' " "3.5\" (1.613 m)   Wt 69.4 kg (153 lb)   BMI 26.68 kg/m²   General/Constitutional: No apparent distress: well-nourished and well developed.  Eyes: normal ocular motion  Cardio: RRR, Normal S1S2, No m/r/g.   Lymphatic: No appreciable lymphadenopathy  Respiratory: Non-labored breathing, CTA b/l no w/c/r  Vascular: No edema, swelling or tenderness, except as noted in detailed exam. Extremities well perfused. No LE edema  Integumentary: No impressive skin lesions present, except as noted in detailed exam.  Neuro: No ataxia or tremors noted  Psych: Normal mood and affect, oriented to person, place and time. Appropriate affect.  Musculoskeletal: Normal, except as noted in detailed exam and in HPI.    Gait and Station:   normal    Right Knee:      Inspection:  normal color, temperature, turgor and moisture    Overall limb alignment: neutral    Effusion: no    ROM 0 to 120 without pain    Extensor Lag: Absent    Palpation: Medial joint line tenderness to palpation    stable to AP translation at 90 deg    Coronal plane stable in full extension    Coronal plane stable in mid-flexion     Motor: 5/5 EHL/FHL/TA/GS/Qd/Hs    Vascular: Toes WWP with BCR    Sensory: SILT DP/SP/Kev/Saph/Ti        Images:  I personally reviewed relevant images in the PACS system and my interpretation is as follows:    X-rays of the right knee: severe medial joint space narrowing, subchondral sclerosis, subchondral cysts, osteophyte formation. No fracture or dislocation.       Large joint arthrocentesis: R knee    Performed by: Azam Roach PA-C  Authorized by: Jorge Zaidi MD    Universal Protocol:  Consent: Verbal consent obtained  Consent given by: patient  Timeout called at: 7/11/2025 10:57 AM.  Patient understanding: patient states understanding of the procedure being performed  Patient identity confirmed: verbally with patient  Supporting Documentation  Indications: pain and diagnostic evaluation     Is this a Visco injection? NoProcedure " "Details  Location: knee - R knee  Needle size: 22 G  Ultrasound guidance: no  Approach: anterolateral  Medications administered: 12 mg betamethasone acetate-betamethasone sodium phosphate 6 (3-3) mg/mL; 2 mL lidocaine 1 %    Patient tolerance: patient tolerated the procedure well with no immediate complications  Dressing:  Sterile dressing applied        I have spent a total time of 45 minutes in caring for this patient on the day of the visit/encounter including Diagnostic results, Prognosis, Risks and benefits of tx options, Instructions for management, Patient and family education, Importance of tx compliance, Risk factor reductions, Impressions, Counseling / Coordination of care, Documenting in the medical record, and Obtaining or reviewing history  .      Scribe Attestation      I,:  Azam Roach PA-C am acting as a scribe while in the presence of the attending physician.:       I,:  Jorge Zaidi MD personally performed the services described in this documentation    as scribed in my presence.:               Jorge Zaidi MD  Adult Reconstruction Specialist   [facility]         [1]   Allergies  Allergen Reactions    Penicillins Anaphylaxis    Benadryl [Diphenhydramine]      Cant remember reaction   [2]   Past Medical History:  Diagnosis Date    Arthritis     \"hands\"    Foot pain, right     GERD (gastroesophageal reflux disease)     Hyperlipidemia     Osteopenia     \"borderline\"    Positional vertigo     not recently    Rheumatoid arthritis (HCC)     Wears glasses     Wears partial dentures    [3]   Past Surgical History:  Procedure Laterality Date    BREAST BIOPSY      BREAST LUMPECTOMY Left     BENIGN    CHOLECYSTECTOMY      FOOT SURGERY      left foot big toe implant    KNEE SURGERY      MD COLONOSCOPY FLX DX W/COLLJ SPEC WHEN PFRMD N/A 12/01/2017    Procedure: COLONOSCOPY;  Surgeon: Carol Adams MD;  Location: [facility];  Service: Colorectal    MD CORRECTION " HAMMERTOE Right 03/22/2019    Procedure: HAMMERTOE CORRECTION;  Surgeon: Juan Mccurdy DPM;  Location: [facility];  Service: Podiatry    KS OPEN TX METATARSOPHALANGEAL JOINT DISLOCATION Right 03/22/2019    Procedure: 2ND MPJ SUBLAXATION REPAIR;  Surgeon: Juan Mccurdy DPM;  Location: [facility];  Service: Podiatry   [4]   Family History  Problem Relation Name Age of Onset    Breast cancer Neg Hx      Cervical cancer Neg Hx      Colon cancer Neg Hx      Endometrial cancer Neg Hx      Ovarian cancer Neg Hx      Uterine cancer Neg Hx     [5]   Social History  Tobacco Use   Smoking Status Never   Smokeless Tobacco Never

## 2025-07-13 RX ORDER — BETAMETHASONE SODIUM PHOSPHATE AND BETAMETHASONE ACETATE 3; 3 MG/ML; MG/ML
12 INJECTION, SUSPENSION INTRA-ARTICULAR; INTRALESIONAL; INTRAMUSCULAR; SOFT TISSUE
Status: COMPLETED | OUTPATIENT
Start: 2025-07-11 | End: 2025-07-11

## 2025-07-13 RX ORDER — LIDOCAINE HYDROCHLORIDE 10 MG/ML
2 INJECTION, SOLUTION INFILTRATION; PERINEURAL
Status: COMPLETED | OUTPATIENT
Start: 2025-07-11 | End: 2025-07-11

## 2025-08-05 ENCOUNTER — HOSPITAL ENCOUNTER (OUTPATIENT)
Dept: RADIOLOGY | Age: 80
Discharge: HOME | End: 2025-08-05
Payer: COMMERCIAL

## 2025-08-05 DIAGNOSIS — Z12.31 ENCOUNTER FOR SCREENING MAMMOGRAM FOR MALIGNANT NEOPLASM OF BREAST: ICD-10-CM

## 2025-08-05 PROCEDURE — 77067 SCR MAMMO BI INCL CAD: CPT

## 2025-08-05 PROCEDURE — 77063 BREAST TOMOSYNTHESIS BI: CPT

## 2025-08-15 ENCOUNTER — TELEPHONE (OUTPATIENT)
Dept: OBGYN CLINIC | Facility: HOSPITAL | Age: 80
End: 2025-08-15

## (undated) DEVICE — SYRINGE 5ML LL

## (undated) DEVICE — DRAPE EQUIPMENT RF WAND

## (undated) DEVICE — TUBING SUCTION 5MM X 12 FT

## (undated) DEVICE — BETHLEHEM UNIVERSAL  MIONR EXT: Brand: CARDINAL HEALTH

## (undated) DEVICE — SUT VICRYL 4-0 PS-2 27 IN J426H

## (undated) DEVICE — 2000CC GUARDIAN II: Brand: GUARDIAN

## (undated) DEVICE — POV-IOD SOLUTION 4OZ BT

## (undated) DEVICE — PENCIL ELECTROSURG E-Z CLEAN -0035H

## (undated) DEVICE — SUT ETHILON 4-0 PS-2 18 IN 1667H

## (undated) DEVICE — 22.5 MM X 6.9 MM X 0.53 MM SAGITTAL BLADE

## (undated) DEVICE — 10FR FRAZIER SUCTION HANDLE: Brand: CARDINAL HEALTH

## (undated) DEVICE — SCD SEQUENTIAL COMPRESSION COMFORT SLEEVE MEDIUM KNEE LENGTH: Brand: KENDALL SCD

## (undated) DEVICE — NEEDLE 18 G X 1 1/2

## (undated) DEVICE — INTENDED FOR TISSUE SEPARATION, AND OTHER PROCEDURES THAT REQUIRE A SHARP SURGICAL BLADE TO PUNCTURE OR CUT.: Brand: BARD-PARKER ® CARBON RIB-BACK BLADES

## (undated) DEVICE — NEEDLE 25G X 1 1/2

## (undated) DEVICE — CAST PADDING 4 IN SYNTHETIC NON-STRL

## (undated) DEVICE — GLOVE SRG BIOGEL 7.5

## (undated) DEVICE — SYRINGE 10ML LL

## (undated) DEVICE — STOCKINETTE REGULAR

## (undated) DEVICE — KIT INSTRUMENT F/RETROFUSION

## (undated) DEVICE — CURITY NON-ADHERENT STRIPS: Brand: CURITY

## (undated) DEVICE — SUT VICRYL 3-0 PS-2 27 IN J427H

## (undated) DEVICE — CHLORAPREP HI-LITE 26ML ORANGE